# Patient Record
Sex: MALE | Race: WHITE | NOT HISPANIC OR LATINO | URBAN - METROPOLITAN AREA
[De-identification: names, ages, dates, MRNs, and addresses within clinical notes are randomized per-mention and may not be internally consistent; named-entity substitution may affect disease eponyms.]

---

## 2017-11-15 ENCOUNTER — EMERGENCY (EMERGENCY)
Facility: HOSPITAL | Age: 47
LOS: 1 days | Discharge: HOME | End: 2017-11-15
Attending: EMERGENCY MEDICINE
Payer: COMMERCIAL

## 2017-11-15 VITALS
DIASTOLIC BLOOD PRESSURE: 88 MMHG | HEART RATE: 97 BPM | OXYGEN SATURATION: 97 % | HEIGHT: 69 IN | RESPIRATION RATE: 16 BRPM | WEIGHT: 175.05 LBS | SYSTOLIC BLOOD PRESSURE: 132 MMHG | TEMPERATURE: 98 F

## 2017-11-15 DIAGNOSIS — J45.20 MILD INTERMITTENT ASTHMA, UNCOMPLICATED: ICD-10-CM

## 2017-11-15 DIAGNOSIS — R55 SYNCOPE AND COLLAPSE: ICD-10-CM

## 2017-11-15 DIAGNOSIS — Z85.841 PERSONAL HISTORY OF MALIGNANT NEOPLASM OF BRAIN: ICD-10-CM

## 2017-11-15 DIAGNOSIS — Z98.890 OTHER SPECIFIED POSTPROCEDURAL STATES: ICD-10-CM

## 2017-11-15 DIAGNOSIS — R11.10 VOMITING, UNSPECIFIED: ICD-10-CM

## 2017-11-15 DIAGNOSIS — Z79.82 LONG TERM (CURRENT) USE OF ASPIRIN: ICD-10-CM

## 2017-11-15 DIAGNOSIS — R47.01 APHASIA: ICD-10-CM

## 2017-11-15 DIAGNOSIS — Z79.51 LONG TERM (CURRENT) USE OF INHALED STEROIDS: ICD-10-CM

## 2017-11-15 DIAGNOSIS — Z79.899 OTHER LONG TERM (CURRENT) DRUG THERAPY: ICD-10-CM

## 2017-11-15 DIAGNOSIS — I10 ESSENTIAL (PRIMARY) HYPERTENSION: ICD-10-CM

## 2017-11-15 LAB
ANION GAP SERPL CALC-SCNC: 13 MMOL/L — SIGNIFICANT CHANGE UP (ref 5–17)
APTT BLD: 29.5 SEC — SIGNIFICANT CHANGE UP (ref 27.5–37.4)
BASOPHILS # BLD AUTO: 0 K/UL — SIGNIFICANT CHANGE UP (ref 0–0.2)
BASOPHILS NFR BLD AUTO: 0.4 % — SIGNIFICANT CHANGE UP (ref 0–2)
BUN SERPL-MCNC: 8 MG/DL — SIGNIFICANT CHANGE UP (ref 8–20)
CALCIUM SERPL-MCNC: 9.3 MG/DL — SIGNIFICANT CHANGE UP (ref 8.6–10.2)
CHLORIDE SERPL-SCNC: 99 MMOL/L — SIGNIFICANT CHANGE UP (ref 98–107)
CK SERPL-CCNC: 69 U/L — SIGNIFICANT CHANGE UP (ref 30–200)
CO2 SERPL-SCNC: 26 MMOL/L — SIGNIFICANT CHANGE UP (ref 22–29)
CREAT SERPL-MCNC: 0.69 MG/DL — SIGNIFICANT CHANGE UP (ref 0.5–1.3)
EOSINOPHIL # BLD AUTO: 0 K/UL — SIGNIFICANT CHANGE UP (ref 0–0.5)
EOSINOPHIL NFR BLD AUTO: 0 % — SIGNIFICANT CHANGE UP (ref 0–5)
GLUCOSE SERPL-MCNC: 130 MG/DL — HIGH (ref 70–115)
HCT VFR BLD CALC: 37.7 % — LOW (ref 42–52)
HGB BLD-MCNC: 12.9 G/DL — LOW (ref 14–18)
INR BLD: 1.04 RATIO — SIGNIFICANT CHANGE UP (ref 0.88–1.16)
LYMPHOCYTES # BLD AUTO: 0.8 K/UL — LOW (ref 1–4.8)
LYMPHOCYTES # BLD AUTO: 17.7 % — LOW (ref 20–55)
MAGNESIUM SERPL-MCNC: 1.8 MG/DL — SIGNIFICANT CHANGE UP (ref 1.6–2.6)
MCHC RBC-ENTMCNC: 29.1 PG — SIGNIFICANT CHANGE UP (ref 27–31)
MCHC RBC-ENTMCNC: 34.2 G/DL — SIGNIFICANT CHANGE UP (ref 32–36)
MCV RBC AUTO: 85.1 FL — SIGNIFICANT CHANGE UP (ref 80–94)
MONOCYTES # BLD AUTO: 0.4 K/UL — SIGNIFICANT CHANGE UP (ref 0–0.8)
MONOCYTES NFR BLD AUTO: 9.4 % — SIGNIFICANT CHANGE UP (ref 3–10)
NEUTROPHILS # BLD AUTO: 3.5 K/UL — SIGNIFICANT CHANGE UP (ref 1.8–8)
NEUTROPHILS NFR BLD AUTO: 72.5 % — SIGNIFICANT CHANGE UP (ref 37–73)
PHOSPHATE SERPL-MCNC: 2.5 MG/DL — SIGNIFICANT CHANGE UP (ref 2.4–4.7)
PLATELET # BLD AUTO: 196 K/UL — SIGNIFICANT CHANGE UP (ref 150–400)
POTASSIUM SERPL-MCNC: 3.6 MMOL/L — SIGNIFICANT CHANGE UP (ref 3.5–5.3)
POTASSIUM SERPL-SCNC: 3.6 MMOL/L — SIGNIFICANT CHANGE UP (ref 3.5–5.3)
PROTHROM AB SERPL-ACNC: 11.5 SEC — SIGNIFICANT CHANGE UP (ref 9.8–12.7)
RBC # BLD: 4.43 M/UL — LOW (ref 4.6–6.2)
RBC # FLD: 13.5 % — SIGNIFICANT CHANGE UP (ref 11–15.6)
SODIUM SERPL-SCNC: 138 MMOL/L — SIGNIFICANT CHANGE UP (ref 135–145)
VALPROATE SERPL-MCNC: <3.7 UG/ML — LOW (ref 50–100)
WBC # BLD: 4.8 K/UL — SIGNIFICANT CHANGE UP (ref 4.8–10.8)
WBC # FLD AUTO: 4.8 K/UL — SIGNIFICANT CHANGE UP (ref 4.8–10.8)

## 2017-11-15 PROCEDURE — 84100 ASSAY OF PHOSPHORUS: CPT

## 2017-11-15 PROCEDURE — 85027 COMPLETE CBC AUTOMATED: CPT

## 2017-11-15 PROCEDURE — 80048 BASIC METABOLIC PNL TOTAL CA: CPT

## 2017-11-15 PROCEDURE — 83735 ASSAY OF MAGNESIUM: CPT

## 2017-11-15 PROCEDURE — 93005 ELECTROCARDIOGRAM TRACING: CPT

## 2017-11-15 PROCEDURE — 36415 COLL VENOUS BLD VENIPUNCTURE: CPT

## 2017-11-15 PROCEDURE — 99283 EMERGENCY DEPT VISIT LOW MDM: CPT | Mod: 25

## 2017-11-15 PROCEDURE — 82550 ASSAY OF CK (CPK): CPT

## 2017-11-15 PROCEDURE — 99285 EMERGENCY DEPT VISIT HI MDM: CPT

## 2017-11-15 PROCEDURE — 93010 ELECTROCARDIOGRAM REPORT: CPT

## 2017-11-15 PROCEDURE — 80164 ASSAY DIPROPYLACETIC ACD TOT: CPT

## 2017-11-15 PROCEDURE — 85730 THROMBOPLASTIN TIME PARTIAL: CPT

## 2017-11-15 PROCEDURE — 85610 PROTHROMBIN TIME: CPT

## 2017-11-15 NOTE — ED PROVIDER NOTE - PHYSICAL EXAMINATION
neuro: CN II - XII intact, EOMI, PERRL, no papilledema, 5/5 muscle strength x 4 extremities, no sensory deficits, 2+ dtr globally, negative babinski, no ataxic gait, normal NEHEMIAS and FNT, normal romberg

## 2017-11-15 NOTE — ED PROVIDER NOTE - MEDICAL DECISION MAKING DETAILS
pt knowen to me back to baseline near syncoope will cover abx if develops symtpoms back to baserline likely focal seizure chornic pt back at baseline neuro exam showing neuro: CN II - XII intact, EOMI, PERRL, no papilledema, 5/5 muscle strength x 4 extremities, no sensory deficits, 2+ dtr globally, negative babinski, no ataxic gait, normal NEHEMIAS and FNT, normal romberg

## 2017-11-15 NOTE — ED ADULT TRIAGE NOTE - CHIEF COMPLAINT QUOTE
Rapid response on floor, may have aspirated vomit. Reports hx of episodes of aphasia after brain surgery. States last episode was 2 months ago. Current episode during triage, . Denies trauma, injury or falls. 20g placed during rapid.

## 2017-11-15 NOTE — ED PROVIDER NOTE - OBJECTIVE STATEMENT
pt with near syncope after smelling vomit while working and slight aphasia normal for him after he has slight focal seizure for which he is compliant with lamictal [facility] denies any  complaints to me . denies fever. denies HA or neck pain. no chest pain or sob. no abd pain. no n/v/d. no urinary f/u/d. no back pain. no motor or sensory deficits. denies illicit drug use. no recent travel. no rash. no other acute issues symptoms or concerns

## 2020-04-25 ENCOUNTER — MESSAGE (OUTPATIENT)
Age: 50
End: 2020-04-25

## 2020-05-01 ENCOUNTER — APPOINTMENT (OUTPATIENT)
Dept: DISASTER EMERGENCY | Facility: HOSPITAL | Age: 50
End: 2020-05-01

## 2020-05-02 LAB
SARS-COV-2 IGG SERPL IA-ACNC: <0.1 INDEX
SARS-COV-2 IGG SERPL QL IA: NEGATIVE

## 2022-01-06 ENCOUNTER — APPOINTMENT (OUTPATIENT)
Dept: UROLOGY | Facility: CLINIC | Age: 52
End: 2022-01-06
Payer: COMMERCIAL

## 2022-01-06 VITALS
BODY MASS INDEX: 23.1 KG/M2 | SYSTOLIC BLOOD PRESSURE: 143 MMHG | WEIGHT: 165 LBS | HEIGHT: 71 IN | HEART RATE: 88 BPM | DIASTOLIC BLOOD PRESSURE: 85 MMHG

## 2022-01-06 DIAGNOSIS — N20.0 CALCULUS OF KIDNEY: ICD-10-CM

## 2022-01-06 PROCEDURE — 99204 OFFICE O/P NEW MOD 45 MIN: CPT

## 2022-01-18 NOTE — PHYSICAL EXAM
[General Appearance - Well Developed] : well developed [General Appearance - Well Nourished] : well nourished [Normal Appearance] : normal appearance [Well Groomed] : well groomed [General Appearance - In No Acute Distress] : no acute distress [Edema] : no peripheral edema [Respiration, Rhythm And Depth] : normal respiratory rhythm and effort [Exaggerated Use Of Accessory Muscles For Inspiration] : no accessory muscle use [Abdomen Soft] : soft [Abdomen Tenderness] : non-tender [Costovertebral Angle Tenderness] : no ~M costovertebral angle tenderness [Normal Station and Gait] : the gait and station were normal for the patient's age [] : no rash [No Focal Deficits] : no focal deficits [Oriented To Time, Place, And Person] : oriented to person, place, and time [Affect] : the affect was normal [Mood] : the mood was normal [Not Anxious] : not anxious [FreeTextEntry1] : deferred at pt request

## 2022-01-18 NOTE — ASSESSMENT
[FreeTextEntry1] : Diet modification reviewed at length- increasing fluids (primarily water), citrus is good, and decreasing/moderating salt, animal flesh protein, oxalate containing foods, and moderation of calcium intake (1000 mg/day is USRDA).\par \par I reviewed with the patient the risks of metabolic stone disease given their underlying risk parameters (all of which include large stones, multiple stones, bilateral stones, family history, and young age), and the indications for 24 hour urine metabolic assessment.\par \par We also discussed benefits of regular exercise and weight loss as independent risk reducers for stones.\par \par 1. Diet modification as discussed\par 2. 24 hour urine collection x 2 in the next few weeks\par 3. RTC/ tele to review\par \par May benefit from starting pot citrate therapy given CT findings possible mild medullary nephrocalcinosis and potential for medullary sponge, though faintly seen. Will eval after 24 hour urine\par \par (Had full exam with other docs recently seen)\par

## 2022-01-18 NOTE — HISTORY OF PRESENT ILLNESS
[None] : no symptoms [FreeTextEntry1] : COBY BUI is a 51 year M who presents for eval for stone. CT scan ([facility]) with stone- had experienced left flank pain. Feeling well at this time. No hematuria, no fever.\par \par No sig FH. \par \par CT scan 12/2022 [facility]: suggestion of stone/nephrocalcinosis (mild), but may be reflective of medullary sponge kidney\par \par PMH: htn\par PSH: neuro sx\par FH: no sig  FH\par Meds: asa, valsartan\par SH: social etoh, nonsmoker\par Allergies: nkda\par

## 2022-01-20 ENCOUNTER — EMERGENCY (EMERGENCY)
Facility: HOSPITAL | Age: 52
LOS: 1 days | Discharge: HOME | End: 2022-01-20
Attending: EMERGENCY MEDICINE
Payer: COMMERCIAL

## 2022-01-20 VITALS
HEART RATE: 84 BPM | TEMPERATURE: 98 F | OXYGEN SATURATION: 97 % | SYSTOLIC BLOOD PRESSURE: 168 MMHG | RESPIRATION RATE: 21 BRPM | HEIGHT: 69 IN | DIASTOLIC BLOOD PRESSURE: 74 MMHG

## 2022-01-20 VITALS — DIASTOLIC BLOOD PRESSURE: 74 MMHG | HEART RATE: 87 BPM | SYSTOLIC BLOOD PRESSURE: 131 MMHG

## 2022-01-20 LAB
ALBUMIN SERPL ELPH-MCNC: 5 G/DL — SIGNIFICANT CHANGE UP (ref 3.3–5.2)
ALP SERPL-CCNC: 41 U/L — SIGNIFICANT CHANGE UP (ref 40–120)
ALT FLD-CCNC: 21 U/L — SIGNIFICANT CHANGE UP
ANION GAP SERPL CALC-SCNC: 15 MMOL/L — SIGNIFICANT CHANGE UP (ref 5–17)
APTT BLD: 27.7 SEC — SIGNIFICANT CHANGE UP (ref 27.5–35.5)
AST SERPL-CCNC: 22 U/L — SIGNIFICANT CHANGE UP
BASE EXCESS BLDV CALC-SCNC: -1.1 MMOL/L — SIGNIFICANT CHANGE UP (ref -2–3)
BASOPHILS # BLD AUTO: 0.06 K/UL — SIGNIFICANT CHANGE UP (ref 0–0.2)
BASOPHILS NFR BLD AUTO: 0.9 % — SIGNIFICANT CHANGE UP (ref 0–2)
BILIRUB SERPL-MCNC: 0.7 MG/DL — SIGNIFICANT CHANGE UP (ref 0.4–2)
BUN SERPL-MCNC: 19.9 MG/DL — SIGNIFICANT CHANGE UP (ref 8–20)
CALCIUM SERPL-MCNC: 9.6 MG/DL — SIGNIFICANT CHANGE UP (ref 8.6–10.2)
CHLORIDE SERPL-SCNC: 101 MMOL/L — SIGNIFICANT CHANGE UP (ref 98–107)
CO2 SERPL-SCNC: 21 MMOL/L — LOW (ref 22–29)
CREAT SERPL-MCNC: 0.63 MG/DL — SIGNIFICANT CHANGE UP (ref 0.5–1.3)
EOSINOPHIL # BLD AUTO: 0 K/UL — SIGNIFICANT CHANGE UP (ref 0–0.5)
EOSINOPHIL NFR BLD AUTO: 0 % — SIGNIFICANT CHANGE UP (ref 0–6)
FLUAV AG NPH QL: SIGNIFICANT CHANGE UP
FLUBV AG NPH QL: SIGNIFICANT CHANGE UP
GLUCOSE SERPL-MCNC: 114 MG/DL — HIGH (ref 70–99)
HCO3 BLDV-SCNC: 23 MMOL/L — SIGNIFICANT CHANGE UP (ref 22–29)
HCT VFR BLD CALC: 39.6 % — SIGNIFICANT CHANGE UP (ref 39–50)
HGB BLD-MCNC: 12.9 G/DL — LOW (ref 13–17)
IMM GRANULOCYTES NFR BLD AUTO: 0.2 % — SIGNIFICANT CHANGE UP (ref 0–1.5)
INR BLD: 1.03 RATIO — SIGNIFICANT CHANGE UP (ref 0.88–1.16)
LYMPHOCYTES # BLD AUTO: 0.92 K/UL — LOW (ref 1–3.3)
LYMPHOCYTES # BLD AUTO: 14.4 % — SIGNIFICANT CHANGE UP (ref 13–44)
MCHC RBC-ENTMCNC: 27.4 PG — SIGNIFICANT CHANGE UP (ref 27–34)
MCHC RBC-ENTMCNC: 32.6 GM/DL — SIGNIFICANT CHANGE UP (ref 32–36)
MCV RBC AUTO: 84.1 FL — SIGNIFICANT CHANGE UP (ref 80–100)
MONOCYTES # BLD AUTO: 0.7 K/UL — SIGNIFICANT CHANGE UP (ref 0–0.9)
MONOCYTES NFR BLD AUTO: 11 % — SIGNIFICANT CHANGE UP (ref 2–14)
NEUTROPHILS # BLD AUTO: 4.68 K/UL — SIGNIFICANT CHANGE UP (ref 1.8–7.4)
NEUTROPHILS NFR BLD AUTO: 73.5 % — SIGNIFICANT CHANGE UP (ref 43–77)
PCO2 BLDV: 36 MMHG — LOW (ref 42–55)
PH BLDV: 7.42 — SIGNIFICANT CHANGE UP (ref 7.32–7.43)
PLATELET # BLD AUTO: 307 K/UL — SIGNIFICANT CHANGE UP (ref 150–400)
PO2 BLDV: 138 MMHG — HIGH (ref 25–45)
POTASSIUM SERPL-MCNC: 4.5 MMOL/L — SIGNIFICANT CHANGE UP (ref 3.5–5.3)
POTASSIUM SERPL-SCNC: 4.5 MMOL/L — SIGNIFICANT CHANGE UP (ref 3.5–5.3)
PROT SERPL-MCNC: 7.5 G/DL — SIGNIFICANT CHANGE UP (ref 6.6–8.7)
PROTHROM AB SERPL-ACNC: 11.9 SEC — SIGNIFICANT CHANGE UP (ref 10.6–13.6)
RBC # BLD: 4.71 M/UL — SIGNIFICANT CHANGE UP (ref 4.2–5.8)
RBC # FLD: 12.6 % — SIGNIFICANT CHANGE UP (ref 10.3–14.5)
RSV RNA NPH QL NAA+NON-PROBE: SIGNIFICANT CHANGE UP
SAO2 % BLDV: 99.7 % — SIGNIFICANT CHANGE UP
SARS-COV-2 RNA SPEC QL NAA+PROBE: SIGNIFICANT CHANGE UP
SODIUM SERPL-SCNC: 137 MMOL/L — SIGNIFICANT CHANGE UP (ref 135–145)
TROPONIN T SERPL-MCNC: <0.01 NG/ML — SIGNIFICANT CHANGE UP (ref 0–0.06)
WBC # BLD: 6.37 K/UL — SIGNIFICANT CHANGE UP (ref 3.8–10.5)
WBC # FLD AUTO: 6.37 K/UL — SIGNIFICANT CHANGE UP (ref 3.8–10.5)

## 2022-01-20 PROCEDURE — 99284 EMERGENCY DEPT VISIT MOD MDM: CPT

## 2022-01-20 PROCEDURE — 70496 CT ANGIOGRAPHY HEAD: CPT | Mod: MA

## 2022-01-20 PROCEDURE — 99284 EMERGENCY DEPT VISIT MOD MDM: CPT | Mod: 25

## 2022-01-20 PROCEDURE — 99291 CRITICAL CARE FIRST HOUR: CPT

## 2022-01-20 PROCEDURE — 96374 THER/PROPH/DIAG INJ IV PUSH: CPT | Mod: XU

## 2022-01-20 PROCEDURE — 70498 CT ANGIOGRAPHY NECK: CPT | Mod: MA

## 2022-01-20 PROCEDURE — 82803 BLOOD GASES ANY COMBINATION: CPT

## 2022-01-20 PROCEDURE — 80053 COMPREHEN METABOLIC PANEL: CPT

## 2022-01-20 PROCEDURE — 95819 EEG AWAKE AND ASLEEP: CPT | Mod: 26

## 2022-01-20 PROCEDURE — 85730 THROMBOPLASTIN TIME PARTIAL: CPT

## 2022-01-20 PROCEDURE — 99223 1ST HOSP IP/OBS HIGH 75: CPT

## 2022-01-20 PROCEDURE — 36415 COLL VENOUS BLD VENIPUNCTURE: CPT

## 2022-01-20 PROCEDURE — 87637 SARSCOV2&INF A&B&RSV AMP PRB: CPT

## 2022-01-20 PROCEDURE — 82962 GLUCOSE BLOOD TEST: CPT

## 2022-01-20 PROCEDURE — 0042T: CPT

## 2022-01-20 PROCEDURE — 85610 PROTHROMBIN TIME: CPT

## 2022-01-20 PROCEDURE — 95819 EEG AWAKE AND ASLEEP: CPT

## 2022-01-20 PROCEDURE — 70498 CT ANGIOGRAPHY NECK: CPT | Mod: 26,MA

## 2022-01-20 PROCEDURE — 93005 ELECTROCARDIOGRAM TRACING: CPT

## 2022-01-20 PROCEDURE — 85025 COMPLETE CBC W/AUTO DIFF WBC: CPT

## 2022-01-20 PROCEDURE — 84484 ASSAY OF TROPONIN QUANT: CPT

## 2022-01-20 PROCEDURE — 93010 ELECTROCARDIOGRAM REPORT: CPT

## 2022-01-20 PROCEDURE — 70450 CT HEAD/BRAIN W/O DYE: CPT | Mod: MA

## 2022-01-20 PROCEDURE — 70496 CT ANGIOGRAPHY HEAD: CPT | Mod: 26,MA

## 2022-01-20 RX ORDER — LEVETIRACETAM 250 MG/1
1000 TABLET, FILM COATED ORAL ONCE
Refills: 0 | Status: DISCONTINUED | OUTPATIENT
Start: 2022-01-20 | End: 2022-01-20

## 2022-01-20 RX ORDER — LEVETIRACETAM 250 MG/1
1 TABLET, FILM COATED ORAL
Qty: 28 | Refills: 0
Start: 2022-01-20 | End: 2022-02-02

## 2022-01-20 RX ORDER — LEVETIRACETAM 250 MG/1
1000 TABLET, FILM COATED ORAL ONCE
Refills: 0 | Status: DISCONTINUED | OUTPATIENT
Start: 2022-01-20 | End: 2022-01-24

## 2022-01-20 RX ORDER — LEVETIRACETAM 250 MG/1
2000 TABLET, FILM COATED ORAL EVERY 12 HOURS
Refills: 0 | Status: DISCONTINUED | OUTPATIENT
Start: 2022-01-20 | End: 2022-01-20

## 2022-01-20 RX ADMIN — Medication 2 MILLIGRAM(S): at 09:30

## 2022-01-20 RX ADMIN — LEVETIRACETAM 400 MILLIGRAM(S): 250 TABLET, FILM COATED ORAL at 09:30

## 2022-01-20 NOTE — ED PROVIDER NOTE - CARE PROVIDER_API CALL
Mando Harper)  EEGEpilepsy; Neurology  [address] [place] [address]  Phone: [phone]  Fax: [phone]  Follow Up Time: Urgent

## 2022-01-20 NOTE — EEG REPORT - NS EEG TEXT BOX
[facility]   COMPREHENSIVE EPILEPSY CENTER   REPORT OF ROUTINE VIDEO EEG     [facility]: [address], [place] [address], Ph#: [phone]  [facility]: [address], [place] [address], Ph#: [phone]  [facility]: [address] [place] [address], Ph#: [phone]    Patient Name: COBY BUI  Age and : 51y ([birth year])  MRN #: [id number]  Location: [facility] ED  Referring Physician: Not Available Doctor    Study Date: 22    _____________________________________________________________  TECHNICAL INFORMATION    Placement and Labeling of Electrodes:  The EEG was performed utilizing 20 channels referential EEG connections (coronal over temporal over parasagittal montage) using all standard 10-20 electrode placements with EKG.  Recording was at a sampling rate of 256 samples per second per channel.  Time synchronized digital video recording was done simultaneously with EEG recording.  A low light infrared camera was used for low light recording.  Rohit and seizure detection algorithms were utilized.    _____________________________________________________________  HISTORY    Patient is a 51y old  Male who presents with a chief complaint of seizure.    PERTINENT MEDICATION:  levETIRAcetam  IVPB 1000 milliGRAM(s) IV Intermittent once    _____________________________________________________________  STUDY INTERPRETATION    Findings: The background was continuous and reactive. During wakefulness, the posterior dominant rhythm consisted of symmetric, well-modulated 10 Hz activity, with amplitude to 30 uV, that attenuated to eye opening.      Background Slowing:  No generalized background slowing was present.    Focal Slowing:   None were present.    Sleep Background:  Drowsiness was characterized by fragmentation, attenuation, and slowing of the background activity.    Sleep was characterized by the presence of vertex waves, symmetric sleep spindles and K-complexes.    Other Non-Epileptiform Findings:  None were present.    Interictal Epileptiform Activity:   None were present.    Events:  Clinical events: None recorded.  Seizures: None recorded.    Activation Procedures:   Hyperventilation was not performed.    Photic stimulation was performed and did not elicit any abnormality.     Artifacts:  Intermittent myogenic and movement artifacts were noted.    ECG:  The heart rate on single channel ECG was predominantly between 70-80 BPM.    _____________________________________________________________  EEG SUMMARY/CLASSIFICATION    Normal EEG in the awake, drowsy and asleep states.    _____________________________________________________________  EEG IMPRESSION/CLINICAL CORRELATE    Normal EEG study.  No epileptiform pattern or seizure seen.    _____________________________________________________________    Mando Harper MD  Director, Epilepsy/EMU - [facility]

## 2022-01-20 NOTE — ED PROVIDER NOTE - PROGRESS NOTE DETAILS
witness generalized tonic clonic seizure lasting 20 seconds. Ativan given and started on keppra. -DO Graciela Epilepsy paged. Bobbi Dimas DO Spoke with Dr. Harper, will see patient. Bobbi Dimas DO Dr. Harper saw patient. Patient planned for 20 min EEG. Prefer that patient receives MRI as outpatient as quality is better. Spoke with patient and wife who agree with plan. Pending EEG will dc home with eze 1g BID. Bobbi Dimas DO

## 2022-01-20 NOTE — ED PROVIDER NOTE - CLINICAL SUMMARY MEDICAL DECISION MAKING FREE TEXT BOX
52yo male with history of HTN, asthma, MVP, seizure disorder, s/p temporal lobe mass resection presenting with aphasia and weakness s/p witness seizure. Patient taken off of antiepileptic medication and last seizure prior to temporal lobe mass resection. NIH score of 1 for aphasia. Post ictal on examination, without tongue laceration or abrasion, no urinary incontinence. Patient returned to baseline. Labs, imaging.

## 2022-01-20 NOTE — CONSULT NOTE ADULT - ASSESSMENT
The patient is a 51y Male who is followed by neurology because of code stroke    Code stroke  initially had aphasia, had generalized seizure in ED  Has history of left temporal lobe resection for seizure in 2016    This is not a stroke, but the aphasia could have been seizure or aura  Agree with keppra for now    I have asked Dr Harper from our Epilepsy service to assess him and treat for his seizures    Discussed with Dee Dimas and Leroy.    Thank you for allowing me to participate in the care of your patient    Ruben Veronica MD, PhD   [id number]
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 Hernandez, NSG Dr. Mcfarland at [facility]/[place]), off antiseizure medication since 2017/2018 who presented with seizure recurrence. Personally reviewed all imagines, labs, EEG and other studies.    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 Will resume antiseizure medication.     Recommendation:  - another levetiracetam 1000mg, so patient will be loaded a total of 2000mg   - discharge on levetiracetam 1000mg BID  - 3T MRI brain w/wo epilepsy protocol outpatient   - plan for 48h aEEG outpatient   - patient was educated regarding risks and driving privileges associated with the [facility] Guidelines; patient instructed not to drive at this time      Thank you for allowing Epilepsy to participate in the care of this patient.   ______________________  Mando Harper MD   Director, Epilepsy/EMU - [facility]   Epilepsy Consult #: [phone] ([phone])

## 2022-01-20 NOTE — CONSULT NOTE ADULT - SUBJECTIVE AND OBJECTIVE BOX
[facility]                                     Neurology at [place]                                 Glen[address] Schaeffer, [address]                                  [address]                                        [place], [place], [address]                                             [phone]    CC: code stroke, aphasia  HPI: The patient is a 51y Male who presented with acute onset of dificulty speaking at about 0845 today.  He felt funny and had aphasia.  He was brought to the ED and code stroke called.  While in the ED he had a generalized tonic clonic seizure.  he has had seizures in past due to cortivcal dysplasia in left temporal lobe and is s/p resection.  I responded to the code stroke.    PAST MEDICAL & SURGICAL HISTORY:  Hypertension    Mild intermittent asthma without complication    Mitral valve prolapse    epilepsy surgery left temporal lobe resection in 2016        MEDICATIONS  (STANDING):  levETIRAcetam 1000 milliGRAM(s) Oral once    MEDICATIONS  (PRN):      Allergies    No Known Allergies    Intolerances        SOCIAL HISTORY:  no tob,   no alcohol   no drugs    FAMILY HISTORY:  Family history of TIAs (Father, Mother)          ROS: 14 point ROS negative other than what is present in HPI or below    Vital Signs Last 24 Hrs  T(C): 36.8 (20 Jan 2022 09:05), Max: 36.8 (20 Jan 2022 09:05)  T(F): 98.3 (20 Jan 2022 09:05), Max: 98.3 (20 Jan 2022 09:05)  HR: 85 (20 Jan 2022 09:15) (84 - 85)  BP: 141/92 (20 Jan 2022 09:15) (141/92 - 168/74)  BP(mean): --  RR: 19 (20 Jan 2022 09:15) (19 - 21)  SpO2: 98% (20 Jan 2022 09:15) (97% - 98%)      General: NAD    Detailed Neurologic Exam:    Mental status: The patient is awake and alert and has normal attention span.  The patient is fully oriented in 3 spheres. The patient is oriented to current events. The patient is able to name objects, follow commands, repeat sentences.    Cranial nerves: Pupils equal and react symmetrically to light. There is no visual field deficit to confrontation. Extraocular motion is full with no nystagmus. There is no ptosis. Facial sensation is intact. Facial musculature is symmetric. Palate elevates symmetrically. Shoulder shrug is normal. Tongue is midline.    Motor: There is normal bulk and tone.  There is no tremor.  Strength is 5/5 in the right arm and leg.   Strength is 5/5 in the left arm and leg.    Sensation: Intact to light touch and pin in 4 extremities    Reflexes: 1-2+ throughout and plantar responses are flexor.    Cerebellar: There is no dysmetria on finger to nose testing.    Gait : deferred    [facility] SS:  DATE: 1/20/2022  TIME: 1100  1A: Level of consciousness (0-3): 0  1B: Questions (0-2): 0  1C: Commands (0-2): 0  2: Gaze (0-2): 0  3: Visual fields (0-3): 0  4: Facial palsy (0-3): 0  MOTOR:  5A: Left arm motor drift (0-4): 0  5B: Right arm motor drift (0-4): 0  6A: Left leg motor drift (0-4): 0  6B: Right leg motor drift (0-4): 0  7: Limb ataxia (0-2): 0  SENSORY:  8: Sensation (0-2): 0  SPEECH:  9: Language (0-3): 0  10: Dysarthria (0-2): 0  EXTINCTION:  11: Extinction/inattention (0-2): 0    TOTAL SCORE: 0          LABS:                         12.9   6.37  )-----------( 307      ( 20 Jan 2022 09:01 )             39.6       01-20    137  |  101  |  19.9  ----------------------------<  114<H>  4.5   |  21.0<L>  |  0.63    Ca    9.6      20 Jan 2022 09:01    TPro  7.5  /  Alb  5.0  /  TBili  0.7  /  DBili  x   /  AST  22  /  ALT  21  /  AlkPhos  41  01-20      PT/INR - ( 20 Jan 2022 09:01 )   PT: 11.9 sec;   INR: 1.03 ratio         PTT - ( 20 Jan 2022 09:01 )  PTT:27.7 sec    RADIOLOGY & ADDITIONAL STUDIES (independently reviewed unless otherwise noted):  CT head: no acute CVA, mass or blood (+) post surgical changes left temporal lobe  CTA head: no aneurysm, AVM, LVO or sig stenosis in COW  CTA neck: no sig carotid or vertebral stenosis  CT Perfusion head - CBF<30% volume 0ml, Tmax>6s volume =0ml

## 2022-01-20 NOTE — ED PROVIDER NOTE - NSFOLLOWUPINSTRUCTIONS_ED_ALL_ED_FT
1. Follow up with your primary care physician within 2-3days for reevaluation.  2.  Return to the Emergency Department for worsening, progressive or any other concerning symptoms.   3.  Do not drive until you are cleared by your neurologist.   4. Take medications as prescribed.     A seizure is abnormal electrical activity in the brain; the specific cause may or may not be found. Prior to a seizure you may experience a warning sensation (aura) that may include fear, nausea, dizziness, and visual changes such as flashing lights of spots. Common symptoms during the seizure may include an altered mental status, rhythmic jerking movements, drooling, grunting, loss of bladder or bowel control, or tongue biting. After a seizure, you may feel confused and sleepy.     Do not swim, drive, operate machinery, or engage in any risky activity during which a seizure could cause further injury to you or others. Teach friends and family what to do if you HAVE a seizure which includes laying you on the ground with your head on a cushion and turning you to the side to keep your breathing passages clear in case of vomiting.    SEEK IMMEDIATE MEDICAL CARE IF YOU HAVE ANY OF THE FOLLOWING SYMPTOMS: seizure lasting over 5 minutes, not waking up or persistent altered mental status after the seizure, or more frequent or worsening seizures. 1. Follow up with your neurologist within 2-3days for reevaluation.  2.  Return to the Emergency Department for worsening, progressive or any other concerning symptoms.   3.  Do not drive until you are cleared by your neurologist.   4. Take medications as prescribed.     A seizure is abnormal electrical activity in the brain; the specific cause may or may not be found. Prior to a seizure you may experience a warning sensation (aura) that may include fear, nausea, dizziness, and visual changes such as flashing lights of spots. Common symptoms during the seizure may include an altered mental status, rhythmic jerking movements, drooling, grunting, loss of bladder or bowel control, or tongue biting. After a seizure, you may feel confused and sleepy.     Do not swim, drive, operate machinery, or engage in any risky activity during which a seizure could cause further injury to you or others. Teach friends and family what to do if you HAVE a seizure which includes laying you on the ground with your head on a cushion and turning you to the side to keep your breathing passages clear in case of vomiting.    SEEK IMMEDIATE MEDICAL CARE IF YOU HAVE ANY OF THE FOLLOWING SYMPTOMS: seizure lasting over 5 minutes, not waking up or persistent altered mental status after the seizure, or more frequent or worsening seizures.

## 2022-01-20 NOTE — CONSULT NOTE ADULT - SUBJECTIVE AND OBJECTIVE BOX
[facility]                                                                     MD Hillary Wang DO                                              Epilepsy Consult #: [phone] ([phone])                                               Office: [address] [place] [address], [facility]                                                 Phone: [phone]; Fax: [phone]                            ==============================================    EPILEPSY INITIAL CONSULT NOTE      ADMITTING DIAGNOSIS:     HPI:  This is a 51y RH male with a history of HTN, asthma, MVP, 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 Hernandez, NSG Dr. rBuna CARDENAS/[facility]), off antiseizure medication since 2017/2018 who presented with seizures.     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 On 6/10/2016, patient was talking to his colleague at work when he suddenly noted difficulty getting the words out. No problem with comprehension or writing. No change in mental status. Sometime, expressive aphasia accompanied by inability to move his right arm. Symptoms waxed and weaned over next two days, each episode lasting 1-3m. First evaluated in [employer] ER, later transferred to [facility]. Negative stroke workup. MRI brain showed a nonenhancing 1.0 x 0.8 cm lesion in the left anterior temporal cortex. Continuous video EEG at [facility] recorded 2 habitual expressive aphasia without ictal correlate on EEG. Patient was diagnosed with scalp-negative focal seizures and discharged on divalproex sodium . Patient then saw epileptologist Dr. Hernandez at [facility]. Patient did not tolerate divalproex sodium DR due to mental cloudiness, so he was switched to lamotrigine, with levetiracetam acting as bridging agent while on up-titration of lamotrigine. Eventually underwent resection of the left temporal cortical lesion in 12/2016 at [facility] by NSG Dr. Mcfarland, which was determined to be cortical dysplasia. After surgery and while on lamotrigine monotherapy, patient continued to have rare very brief aphasic episodes. One [facility] ER visit on 11/15/2017 documented such an event. Sometime end of 2017 or beginning of 2018, patient requested to come off of lamotrigine and was tapered off. Patient does not recall any more aphasic episodes, until earlier today. Patient was in the elevator of [employer] when he started to have fluctuating expressive aphasia, followed by a focal to bilateral tonic clonic seizure in ER that lasted ~20s. S/p lorazepam 2mg and levetiracetam 1gm. Currently back to baseline. Routine EEG normal. CTH/CTA/CTP only remarkable for left pterional craniotomy and encephalomalacia and gliosis in the left anterior temporal lobe.    SEIZURE DESCRIPTION AND TYPE:  Type #1  Severity: focal aware seizure   Onset: 6/20/2016  Quality & associated signs/symptoms: expressive aphasia +/- inability to move right arm  Duration: few min, may wax and wean for hours  Timing: last seizure end of 2017 on LTG -> no sz off antiseizure medication (LTG discontinued end of 2017 or beginning of 2018) -> recurrence 1/20/22  Modifying factors: triggered by absence of antiseizure medication   Circadian variation: no    Type #2  Severity: focal to bilateral tonic clonic seizure   Onset: 1/20/22  Quality & associated signs/symptoms: type #1 -> full body convulsion  Duration: 20s  Timing: once  Modifying factors: triggered by absence of antiseizure medication   Circadian variation: no    EPILEPSY TYPE: focal epilepsy, 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 No history of febrile seizure, TBI, CNS infection, or FH of seizures.    CURRENT ASM:  none    PREVIOUS ASM:  divalproex sodium DR 500mg BID - briefly in 6/2016, cognitive impairment     IMAGING:   MRI w/ contrast 6/12/16 ([facility]): 1.0 x 0.8 cm anterior left temporal lobe cortical T2/FLAIR hyperintense lesion without abnormal enhancement.The hippocampal structures are symmetric in morphology and signal intensity.    NEUROPHYSIOLOGY:  rEEG 1/20/22 ([facility]): normal A/D/S    cvEEG 6/12 - 6/15/2016 ([facility]):   - i: Two habitual expressive aphasia captured without ictal correlate on EEG.   - ii: Normal interictal recording.    NEUROPSYCHOLOGY:   Has had NPT at [facility].         PMH:  Hypertension    Mild intermittent asthma without complication    Mitral valve prolapse        PSH:  Resection of left temporal cortical dysplasia 12/2016        MEDICATION:  levETIRAcetam  IVPB 1000 milliGRAM(s) IV Intermittent once    ALLERGIES:  No Known Allergies    FH:  noncontributory    SH:  Social EtOH. No tobacco, illicit drugs.    ROS:  Neurology as per HPI, otherwise negative for constitutional, skin, eyes, ENT, respiratory, cardiovascular, gastrointestinal, genitourinary, musculoskeletal, psychiatric, hematology/lymphatics, endocrine, allergic/immunologic.    VITALS:  T(C): 36.8 (01-20-22 @ 09:05), Max: 36.8 (01-20-22 @ 09:05)  HR: 85 (01-20-22 @ 09:15) (84 - 85)  BP: 141/92 (01-20-22 @ 09:15) (141/92 - 168/74)  ABP: --  RR: 19 (01-20-22 @ 09:15) (19 - 21)  SpO2: 98% (01-20-22 @ 09:15) (97% - 98%)  CVP(cm H2O): --    GENERAL PHYSICAL EXAM:  GEN: no distress  HEENT: NCAT, OP clear  EYES: sclera white, conjunctiva clear, no nystagmus  NECK: supple  CV: RRR, no murmur     		  PULM: CTAB, no wheezing  ABD: soft, +BS, NT  EXT: peripheral pulse intact, no cyanosis  MSK: muscle tone and strength normal  SKIN: warm, dry    NEUROLOGICAL EXAM:  Mental Status  Orientation: alert and oriented to person, place, time, and situation   Language: clear and fluent    Cranial Nerves  II: full visual fields intact   III, IV, VI: PERRL, EOMI  V, VII: facial sensation and movement intact and symmetric   VIII: hearing intact   IX, X: uvula midline, soft palate elevates normally   XI: BL shoulder shrug intact   XII: tongue midline    Motor  5/5 BUE and BLE                 Tone and bulk are normal in upper and lower limbs  No pronator drift    Sensation  Intact to light touch and pinprick in all 4 EXTs    Reflex  2+ in BL biceps and patella                                    Plantar responses downward bilaterally    Coordination  Normal FTN bilaterally    Gait  Deferred      LABS:                          12.9   6.37  )-----------( 307      ( 20 Jan 2022 09:01 )             39.6     01-20    137  |  101  |  19.9  ----------------------------<  114<H>  4.5   |  21.0<L>  |  0.63    Ca    9.6      20 Jan 2022 09:01    TPro  7.5  /  Alb  5.0  /  TBili  0.7  /  DBili  x   /  AST  22  /  ALT  21  /  AlkPhos  41  01-20    CAPILLARY BLOOD GLUCOSE      POCT Blood Glucose.: 118 mg/dL (20 Jan 2022 08:55)    LIVER FUNCTIONS - ( 20 Jan 2022 09:01 )  Alb: 5.0 g/dL / Pro: 7.5 g/dL / ALK PHOS: 41 U/L / ALT: 21 U/L / AST: 22 U/L / GGT: x           PT/INR - ( 20 Jan 2022 09:01 )   PT: 11.9 sec;   INR: 1.03 ratio         PTT - ( 20 Jan 2022 09:01 )  PTT:27.7 sec        OTHER IMAGING AND STUDIES:      < from: CT Angio Neck w/ IV Cont (01.20.22 @ 09:16) >  IMPRESSION:    Brain CT without contrast:  No evidence of intracranial hemorrhage or mass effect. If clinical   suspicion for acute infarct persists, brain MRI can be obtained if there   is no contraindication.    CT perfusion:  No perfusion abnormality.    CT angiography neck: No hemodynamically significant stenosis of the   bilateral cervical ICAs using NASCET criteria.  Patent vertebral   arteries.  No evidence of vascular dissection.    CT angiography brain: No large vessel occlusion. No evidence of aneurysm.    CT head findings were discussed with Dr. Dimas at 1/20/2022 9:18 AM by   Dr. Darrick Gomez with read back confirmation.                                                                              [facility]                                                                     MD Hillary Wang DO                                              Epilepsy Consult #: [phone] ([phone])                                               Office: [address] [place] [address], [facility]                                                 Phone: [phone]; Fax: [phone]                            ==============================================    EPILEPSY INITIAL CONSULT NOTE      ADMITTING DIAGNOSIS:     HPI:  This is a 51y RH male with a history of HTN, asthma, MVP, 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 Hernandez, NSG Dr. Bruna CARDENAS/[facility]), off antiseizure medication since 2017/2018 who presented with seizures.     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 No problem with comprehension or writing. No change in mental status. Sometime, expressive aphasia accompanied by inability to move his right arm. Symptoms waxed and weaned over next two days, each episode lasting 1-3m. First evaluated in [facility] ER, later transferred to [facility]. Negative stroke workup. MRI brain showed a nonenhancing 1.0 x 0.8 cm lesion in the left anterior temporal cortex. Continuous video EEG at [facility] recorded 2 habitual expressive aphasia without ictal correlate on EEG. Patient was diagnosed with scalp-negative focal seizures and discharged on divalproex sodium . Patient then saw epileptologist Dr. Hernandez at [facility]. Patient did not tolerate divalproex sodium DR due to mental cloudiness, so he was switched to lamotrigine, with levetiracetam acting as bridging agent while on up-titration of lamotrigine. Eventually underwent resection of the left temporal cortical lesion in 12/2016 at [facility] by NSG Dr. Mcfarland, which was determined to be cortical dysplasia. After surgery and while on lamotrigine monotherapy, patient continued to have rare very brief aphasic episodes. One [facility] ER visit on 11/15/2017 documented such an event. Sometime end of 2017 or beginning of 2018, patient requested to come off of lamotrigine and was tapered off. Patient does not recall any more aphasic episodes, until earlier today. Patient was in the elevator of [facility] [employer] when he started to have fluctuating expressive aphasia, followed by a focal to bilateral tonic clonic seizure in ER that lasted ~20s. S/p lorazepam 2mg and levetiracetam 1gm. Currently back to baseline. Routine EEG normal. CTH/CTA/CTP only remarkable for left pterional craniotomy and encephalomalacia and gliosis in the left anterior temporal lobe.    SEIZURE DESCRIPTION AND TYPE:  Type #1  Severity: focal aware seizure   Onset: 6/20/2016  Quality & associated signs/symptoms: expressive aphasia +/- inability to move right arm  Duration: few min, may wax and wean for hours  Timing: last seizure end of 2017 on LTG -> no sz off antiseizure medication (LTG discontinued end of 2017 or beginning of 2018) -> recurrence 1/20/22  Modifying factors: triggered by absence of antiseizure medication   Circadian variation: no    Type #2  Severity: focal to bilateral tonic clonic seizure   Onset: 1/20/22  Quality & associated signs/symptoms: type #1 -> full body convulsion  Duration: 20s  Timing: once  Modifying factors: triggered by absence of antiseizure medication   Circadian variation: no    EPILEPSY TYPE: focal epilepsy, 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 No history of febrile seizure, TBI, CNS infection, or FH of seizures.    CURRENT ASM:  none    PREVIOUS ASM:  divalproex sodium DR 500mg BID - briefly in 6/2016, cognitive impairment   levetiracetam - bridging agent when transitioned from divalproex sodium DR to lamotrigine   lamotrigine - discontinued end of 2017 or beginning of 2018    IMAGING:   MRI w/ contrast 6/12/16 ([facility]): 1.0 x 0.8 cm anterior left temporal lobe cortical T2/FLAIR hyperintense lesion without abnormal enhancement. The hippocampal structures are symmetric in morphology and signal intensity.    NEUROPHYSIOLOGY:  rEEG 1/20/22 ([facility]): normal A/D/S    cvEEG 6/12 - 6/15/2016 ([facility]):   - i: Two habitual expressive aphasia captured without ictal correlate on EEG.   - ii: Normal interictal recording.    NEUROPSYCHOLOGY:   Has had NPT at [facility].         PMH:  Hypertension    Mild intermittent asthma without complication    Mitral valve prolapse        PSH:  Resection of left temporal cortical dysplasia 12/2016        MEDICATION:  levETIRAcetam  IVPB 1000 milliGRAM(s) IV Intermittent once    ALLERGIES:  No Known Allergies    FH:  noncontributory    SH:  Social EtOH. No tobacco, illicit drugs.    ROS:  Neurology as per HPI, otherwise negative for constitutional, skin, eyes, ENT, respiratory, cardiovascular, gastrointestinal, genitourinary, musculoskeletal, psychiatric, hematology/lymphatics, endocrine, allergic/immunologic.    VITALS:  T(C): 36.8 (01-20-22 @ 09:05), Max: 36.8 (01-20-22 @ 09:05)  HR: 85 (01-20-22 @ 09:15) (84 - 85)  BP: 141/92 (01-20-22 @ 09:15) (141/92 - 168/74)  ABP: --  RR: 19 (01-20-22 @ 09:15) (19 - 21)  SpO2: 98% (01-20-22 @ 09:15) (97% - 98%)  CVP(cm H2O): --    GENERAL PHYSICAL EXAM:  GEN: no distress  HEENT: NCAT, OP clear  EYES: sclera white, conjunctiva clear, no nystagmus  NECK: supple  CV: RRR, no murmur     		  PULM: CTAB, no wheezing  ABD: soft, +BS, NT  EXT: peripheral pulse intact, no cyanosis  MSK: muscle tone and strength normal  SKIN: warm, dry    NEUROLOGICAL EXAM:  Mental Status  Orientation: alert and oriented to person, place, time, and situation   Language: clear and fluent    Cranial Nerves  II: full visual fields intact   III, IV, VI: PERRL, EOMI  V, VII: facial sensation and movement intact and symmetric   VIII: hearing intact   IX, X: uvula midline, soft palate elevates normally   XI: BL shoulder shrug intact   XII: tongue midline    Motor  5/5 BUE and BLE                 Tone and bulk are normal in upper and lower limbs  No pronator drift    Sensation  Intact to light touch and pinprick in all 4 EXTs    Reflex  2+ in BL biceps and patella                                    Plantar responses downward bilaterally    Coordination  Normal FTN bilaterally    Gait  Deferred      LABS:                          12.9   6.37  )-----------( 307      ( 20 Jan 2022 09:01 )             39.6     01-20    137  |  101  |  19.9  ----------------------------<  114<H>  4.5   |  21.0<L>  |  0.63    Ca    9.6      20 Jan 2022 09:01    TPro  7.5  /  Alb  5.0  /  TBili  0.7  /  DBili  x   /  AST  22  /  ALT  21  /  AlkPhos  41  01-20    CAPILLARY BLOOD GLUCOSE      POCT Blood Glucose.: 118 mg/dL (20 Jan 2022 08:55)    LIVER FUNCTIONS - ( 20 Jan 2022 09:01 )  Alb: 5.0 g/dL / Pro: 7.5 g/dL / ALK PHOS: 41 U/L / ALT: 21 U/L / AST: 22 U/L / GGT: x           PT/INR - ( 20 Jan 2022 09:01 )   PT: 11.9 sec;   INR: 1.03 ratio         PTT - ( 20 Jan 2022 09:01 )  PTT:27.7 sec        OTHER IMAGING AND STUDIES:      < from: CT Angio Neck w/ IV Cont (01.20.22 @ 09:16) >  IMPRESSION:    Brain CT without contrast:  No evidence of intracranial hemorrhage or mass effect. If clinical   suspicion for acute infarct persists, brain MRI can be obtained if there   is no contraindication.    CT perfusion:  No perfusion abnormality.    CT angiography neck: No hemodynamically significant stenosis of the   bilateral cervical ICAs using NASCET criteria.  Patent vertebral   arteries.  No evidence of vascular dissection.    CT angiography brain: No large vessel occlusion. No evidence of aneurysm.    CT head findings were discussed with Dr. Dimas at 1/20/2022 9:18 AM by   Dr. Darrick Gomez with read back confirmation.

## 2022-01-20 NOTE — ED PROVIDER NOTE - ATTENDING CONTRIBUTION TO CARE
HPI: 52yo male with PMH seizure disorder 2/2 "cortical dysplasia" s/p neurosurgical resection ([facility], neurologist Dr. Oconnell)    PE:  Gen: NAD  Head: NCAT  HEENT: Oral mucosa moist, normal conjunctiva  CV: RRR no murmurs, normal perfusion  Resp: CTA b/l, no wheezing, rales, rhonchi, no respiratory distress  GI: Abd Soft NTND  Neuro: No focal neuro deficits  MSK: FROM all 4 extremities, no deformity  Skin: No rash, no bruising  Psych: Normal affect    MDM:        I performed a history and physical exam of the patient and discussed their management with the resident. I reviewed the resident's note and agree with the documented findings and plan of care. My medical decision making and observations are found above. HPI: 52yo male with PMH seizure disorder 2/2 "cortical dysplasia" s/p neurosurgical resection (Dr. Mcfarland, [facility], neurologist Dr. Oconnell) presenting with difficulty speaking. Started approximately 10 minutes prior to ED arrival. Patient states that he was in elevator when he started to feel lightheaded and felt like he was about to have a seizure. States he is not on any antiepileptic medications. Denies fevers/chills/numbness/tingling weakness.     PE:  Gen: NAD  Head: NCAT  HEENT: Oral mucosa moist, normal conjunctiva  CV: RRR no murmurs, normal perfusion  Resp: CTA b/l, no wheezing, rales, rhonchi, no respiratory distress  GI: Abd Soft NTND  Neuro: No focal neuro deficits  MSK: FROM all 4 extremities, no deformity  Skin: No rash, no bruising  Psych: Normal affect    MDM: Pt with seizure aura followed by tonic/clonic seizure lasting approx 20 seconds. Code stroke called, d/w stroke neurologist, Dr. Veronica, because NIHSS currently 0 and h/o brain surgery and seizures not candidate for tPA as risks outweigh benefits. Given keppra/ativan. pending evaluation by epileptology. Bobbi Dimas DO          I performed a history and physical exam of the patient and discussed their management with the resident. I reviewed the resident's note and agree with the documented findings and plan of care. My medical decision making and observations are found above.

## 2022-01-20 NOTE — ED PROVIDER NOTE - NSICDXPASTMEDICALHX_GEN_ALL_CORE_FT
PAST MEDICAL HISTORY:  Hypertension     Mild intermittent asthma without complication     Mitral valve prolapse

## 2022-01-20 NOTE — ED PROVIDER NOTE - OBJECTIVE STATEMENT
50yo male with history of HTN, asthma, MVP, seizure disorder, s/p temporal lobe mass resection presenting with aphasia and weakness. 50yo male with history of HTN, asthma, MVP, seizure disorder, s/p temporal lobe mass resection presenting with aphasia and weakness s/p witness seizure walking out of elevator and placed in chair. Patient brought to critical. Patient trouble finding words and appears weak. Patient states his last seizure was prior to surgery and was taken off antiepileptic medications. Denies fevers, chills, headache, chest pain, palpitations, shortness of breath, cough, nausea, vomiting, diarrhea, hematuria, dysuria. 50yo male with history of HTN, asthma, MVP, seizure disorder, s/p temporal lobe mass resection (5 years ago) presenting with aphasia and weakness s/p witness seizure walking out of elevator and placed in chair. Patient brought to critical. Patient trouble finding words and appears weak. Patient states his last seizure was prior to surgery and was taken off antiepileptic medications. Denies fevers, chills, headache, chest pain, palpitations, shortness of breath, cough, nausea, vomiting, diarrhea, hematuria, dysuria.

## 2022-01-20 NOTE — ED PROVIDER NOTE - NSICDXFAMILYHX_GEN_ALL_CORE_FT
FAMILY HISTORY:  Father  Still living? Unknown  Family history of TIAs, Age at diagnosis: Age Unknown    Mother  Still living? Unknown  Family history of TIAs, Age at diagnosis: Age Unknown

## 2022-01-20 NOTE — ED PROVIDER NOTE - PATIENT PORTAL LINK FT
You can access the FollowMyHealth Patient Portal offered by [facility] by registering at the following website: [email or web address]. By joining [employer]’s FollowMyHealth portal, you will also be able to view your health information using other applications (apps) compatible with our system.

## 2022-01-20 NOTE — ED ADULT TRIAGE NOTE - CHIEF COMPLAINT QUOTE
pt states he was at work on the elevator, began hyperventilating, then had an episode of difficulty getting his words out.  code stroke called.  GCS 15

## 2022-01-20 NOTE — ED PROVIDER NOTE - PHYSICAL EXAMINATION
General: non-toxic appearing in no acute distress. Alert and cooperative. slight aphasia   Head: Normocephalic, atraumatic.  Eyes: PERRLA. No conjunctival injection. No scleral icterus. EOMI  ENMT: Atraumatic external nose and ears. Moist mucous membranes. Oropharynx clear.  Neck: Soft and supple. Full ROM without pain.   Cardiac: Regular rate and regular rhythm. No murmurs.   Resp: Unlabored respiratory effort. Lungs CTAB. Speaking in full sentences. No wheezes.  Abd: Soft, non-tender, non-distended.   MSK: Spine midline and non-tender.   Skin: Warm and dry.   Neuro: AO x 3. Moves all extremities symmetrically. Motor strength and sensation grossly intact.

## 2022-01-24 DIAGNOSIS — G40.802 OTHER EPILEPSY, NOT INTRACTABLE, W/OUT STATUS EPILEPTICUS: ICD-10-CM

## 2022-02-01 ENCOUNTER — APPOINTMENT (OUTPATIENT)
Dept: NEUROLOGY | Facility: CLINIC | Age: 52
End: 2022-02-01
Payer: COMMERCIAL

## 2022-02-01 DIAGNOSIS — I10 ESSENTIAL (PRIMARY) HYPERTENSION: ICD-10-CM

## 2022-02-01 DIAGNOSIS — Z51.81 ENCOUNTER FOR THERAPEUTIC DRUG LVL MONITORING: ICD-10-CM

## 2022-02-01 DIAGNOSIS — Q04.9 CONGENITAL MALFORMATION OF BRAIN, UNSPECIFIED: ICD-10-CM

## 2022-02-01 DIAGNOSIS — I34.1 NONRHEUMATIC MITRAL (VALVE) PROLAPSE: ICD-10-CM

## 2022-02-01 DIAGNOSIS — Z78.9 OTHER SPECIFIED HEALTH STATUS: ICD-10-CM

## 2022-02-01 PROCEDURE — 99215 OFFICE O/P EST HI 40 MIN: CPT

## 2022-02-03 ENCOUNTER — NON-APPOINTMENT (OUTPATIENT)
Age: 52
End: 2022-02-03

## 2022-02-03 ENCOUNTER — APPOINTMENT (OUTPATIENT)
Dept: NEUROLOGY | Facility: CLINIC | Age: 52
End: 2022-02-03
Payer: COMMERCIAL

## 2022-02-03 PROCEDURE — 93040 RHYTHM ECG WITH REPORT: CPT

## 2022-02-03 PROCEDURE — 95816 EEG AWAKE AND DROWSY: CPT

## 2022-02-04 PROCEDURE — 95700 EEG CONT REC W/VID EEG TECH: CPT

## 2022-02-04 PROCEDURE — 95721 EEG PHY/QHP>36<60 HR W/O VID: CPT

## 2022-02-04 PROCEDURE — 95708 EEG WO VID EA 12-26HR UNMNTR: CPT

## 2022-02-07 ENCOUNTER — NON-APPOINTMENT (OUTPATIENT)
Age: 52
End: 2022-02-07

## 2022-02-14 ENCOUNTER — APPOINTMENT (OUTPATIENT)
Dept: NEUROLOGY | Facility: CLINIC | Age: 52
End: 2022-02-14

## 2022-02-24 NOTE — ASSESSMENT
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 Hernandez, NSG Dr. Murillo [facility]), off antiseizure medication since 2017/2018 who presents to establish care for seizure recurrence on 1/20/22. EEG normal. Personally reviewed all images, labs, EEG and other studies. \par \par Flat affect on LEV 1000mg BID, and continues to have very brief focal aware sz's. Discussed options of switching to LCM vs LTG. Will try LCM first, since LTG will take 3 months to reach therapeutic range. Thoroughly went over side effects of medication, detailed direction and tapering/titrating schedule regarding medication administration. Patient was educated regarding risks and driving privileges associated with the [facility] Guidelines; patient instructed not to drive. All questions and concerns answered and addressed in detail to patient's complete satisfaction. Patient verbalized understanding and agreed to plan.\par \par - Cross-titrate LEV 1000mg BID for LCM 150mg BID. Following schedule printed and provided to patient.\par day 1-3: LCM 50mg QHS, LEV 1000mg BID\par week 1: LCM 50mg BID, LEV 1000mg BID\par week 2: LCM 100mg BID, LEV 500mg BID\par week 3: LCM 150mg BID, LEV 500mg QHS\par week 4 and onward: LCM 150mg BID, stop LEV\par \par - MRI brain w/wo, epilepsy protocol (to be done at [facility] d/t insurance coverage)\par - 48hr aEEG\par - CBC, CMP, LCM trough level at week 5\par - obtain record from Dr. Hernandez's office\par - no driving \par - f/u in 2 months\par \par \par All relevant epilepsy AAN quality care measures were addressed and discussed with the patient.\par \par More than 50% of time spent counseling and educating patient about epilepsy specific safety issues including AED side effects and interactions, alcohol consumption, sleep deprivation, risks and driving privileges associated with the [facility] Guidelines, death related to seizures/SUDEP, seizure 1st aid and risks. Patient is educated on seizure precautions, including no driving, no operating machinery, no swimming or bathing, no climbing heights, or engage in any risky activities during which a seizure could cause further injury to pt or others.

## 2022-02-24 NOTE — HISTORY OF PRESENT ILLNESS
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 Hernandez, NSG Dr. 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. On 6/10/2016, patient was talking to his colleague at work when he suddenly noted difficulty getting the words out. No problem with comprehension or writing. No change in mental status. Sometimes, expressive aphasia accompanied by inability to move his right arm. Symptoms waxed and weaned over next two days, each episode lasting 1-3m. First evaluated in [employer] ER, later transferred to [facility]. Negative stroke workup. MRI brain showed a nonenhancing 1.0 x 0.8 cm lesion in the left anterior temporal cortex. Continuous video EEG at [facility] recorded 2 habitual expressive aphasia without ictal correlate on EEG. Patient was diagnosed with scalp-negative focal seizures and discharged on divalproex sodium . Patient then saw epileptologist Dr. Hernandez at [place]. Patient did not tolerate divalproex sodium DR due to mental cloudiness, so he was switched to lamotrigine, with levetiracetam acting as bridging agent while on up-titration of lamotrigine. Eventually underwent resection of the left temporal cortical lesion in 12/2016 at [place] by NSG Dr. Murillo, which was determined to be cortical dysplasia. After surgery and while on lamotrigine monotherapy, patient continued to have rare very brief aphasic episodes. Sometime end of 2017 or beginning of 2018, patient requested to come off of lamotrigine and was tapered off. Patient does not recall any more aphasic episodes, until 1/20/22. Patient was in the elevator of [employer] when he started to have fluctuating expressive aphasia, followed by a focal to bilateral tonic clonic seizure in ER that lasted ~20s. Routine EEG normal. CTH/CTA/CTP only remarkable for left pterional craniotomy and encephalomalacia and gliosis in the left anterior temporal lobe. Discharged on LEV 1000mg BID, but reports flat affect on LEV.\par \par Has had about 4 very brief focal aware seizures since discharge, 2 of which occurred during the office visit, seconds in duration. Pt was awake and alert, but visibly struggled with expressive language during the seizure.\par \par SEIZURE DESCRIPTION AND TYPE:\par Type #1\par Severity: focal aware seizure \par Onset: 6/20/2016\par Quality & associated signs/symptoms: expressive aphasia +/- inability to move right arm\par Duration: few seconds to few min, may wax and wean for hours\par Timing: no sz off antiseizure medication (LTG discontinued end of 2017 or beginning of 2018) -> recurrence 1/20/22\par Modifying factors: triggered by absence of antiseizure medication \par Circadian variation: no\par \par Type #2\par Severity: focal to bilateral tonic clonic seizure \par Onset: 1/20/22\par Quality & associated signs/symptoms: type #1 -> full body convulsion\par Duration: 20s\par Timing: once\par Modifying factors: triggered by absence of antiseizure medication \par Circadian variation: no\par \par EPILEPSY TYPE: focal epilepsy, 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 No history of febrile seizure, TBI, CNS infection, or FH of seizures.\par \par CURRENT ASM:\par LEV 1000mg BID – restarted 1/20/22, flat affect\par \par PREVIOUS ASM:\par divalproex sodium DR 500mg BID - briefly in 6/2016, cognitive impairment \par levetiracetam - bridging agent when transitioned from divalproex sodium DR to lamotrigine \par lamotrigine - discontinued end of 2017 or beginning of 2018\par \par IMAGING: \par MRI w/ contrast 6/12/16 ([facility]): 1.0 x 0.8 cm anterior left temporal lobe cortical T2/FLAIR hyperintense lesion without abnormal enhancement. The hippocampal structures are symmetric in morphology and signal intensity.\par \par NEUROPHYSIOLOGY:\par rEEG 1/20/22 ([employer] [facility] [employer]): normal A/D/S\par \par cvEEG 6/12 - 6/15/2016 ([facility]): \par - i: Two habitual expressive aphasia captured without ictal correlate on EEG. \par - ii: Normal interictal recording.\par \par NEUROPSYCHOLOGY: \par Has had NPT at [place]. \par \par PMH:\par Hypertension\par Mild intermittent asthma without complication\par Mitral valve prolapse\par \par PSH:\par Resection of left temporal cortical dysplasia 12/2016

## 2022-03-09 ENCOUNTER — APPOINTMENT (OUTPATIENT)
Dept: NEUROLOGY | Facility: CLINIC | Age: 52
End: 2022-03-09
Payer: COMMERCIAL

## 2022-03-09 VITALS
HEART RATE: 89 BPM | TEMPERATURE: 98 F | SYSTOLIC BLOOD PRESSURE: 124 MMHG | HEIGHT: 71 IN | DIASTOLIC BLOOD PRESSURE: 70 MMHG | WEIGHT: 168 LBS | OXYGEN SATURATION: 98 % | BODY MASS INDEX: 23.52 KG/M2

## 2022-03-09 PROCEDURE — 99214 OFFICE O/P EST MOD 30 MIN: CPT

## 2022-03-09 NOTE — ASSESSMENT
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 Hernandez, NSG Dr. Murillo [facility]/[place]), off antiseizure medication since 2017/2018 who presents to establish care for seizure recurrence on 1/20/22. EEG normal. Personally reviewed all images, labs, EEG and other studies. \par \par Increase LCM as planned. All questions and concerns answered and addressed in detail to patient's complete satisfaction. Patient verbalized understanding and agreed to plan.\par \par - increase LCM 150mg daily to 150mg BID, +50mg every 1-2 weeks\par - CBC, CMP, LCM trough level after being on LCM 150mg BID for at least 1.5 weeks\par - f/u in 3 months\par \par \par All relevant epilepsy AAN quality care measures were addressed and discussed with the patient.\par \par More than 50% of time spent counseling and educating patient about epilepsy specific safety issues including AED side effects and interactions, alcohol consumption, sleep deprivation, risks and driving privileges associated with the [place] Guidelines, death related to seizures/SUDEP, seizure 1st aid and risks. Patient is educated on seizure precautions, including instruction not to do following after a sz with impaired awareness - no driving, no operating machinery, no swimming or bathing, no climbing heights, or engage in any risky activities during which a seizure could cause further injury to pt or others.

## 2022-03-09 NOTE — HISTORY OF PRESENT ILLNESS
[FreeTextEntry1] : LAST OFFICE VISIT: 2/1/22\par \par PCP: Dr. Matthews\par \par INTERIM HISTORY:\par MRI brain 2/9/22 with stable postsurgical changes. 48h aEEG 2/3 – 2/5/22 normal. At the last visit, LEV switched to LCM; feels much better after coming off of LEV. Should be on LCM 150mg BID, but only taking it once a day in the morning. 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 Hernandez, NSG Dr. 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 On 6/10/2016, patient was talking to his colleague at work when he suddenly noted difficulty getting the words out. No problem with comprehension or writing. No change in mental status. Sometimes, expressive aphasia accompanied by inability to move his right arm. Symptoms waxed and weaned over next two days, each episode lasting 1-3m. First evaluated in [facility] [employer] ER, later transferred to [facility]. Negative stroke workup. MRI brain showed a nonenhancing 1.0 x 0.8 cm lesion in the left anterior temporal cortex. Continuous video EEG at [facility] recorded 2 habitual expressive aphasia without ictal correlate on EEG. Patient was diagnosed with scalp-negative focal seizures and discharged on divalproex sodium . Patient then saw epileptologist Dr. Hernandez at [place]. Tried VPA, LTG, LEV and LCM. Eventually underwent resection of the left temporal cortical lesion in 12/15/2016 at [place] by [facility] Dr. Murillo, which was determined to be cortical dysplasia. After surgery and while on lamotrigine monotherapy, patient continued to have rare very brief aphasic episodes. Sometime end of 2017 or beginning of 2018, patient requested to come off of lamotrigine and was tapered off. Patient does not recall any more aphasic episodes, until 1/20/22. Patient was in the elevator of [employer] when he started to have fluctuating expressive aphasia, followed by a focal to bilateral tonic clonic seizure in ER that lasted ~20s. Routine EEG normal. CTH/CTA/CTP only remarkable for left pterional craniotomy and encephalomalacia and gliosis in the left anterior temporal lobe. Discharged on LEV 1000mg BID, but reports flat affect on LEV.\par \par Has had about 4 very brief focal aware seizures since discharge, 2 of which occurred during the office visit, seconds in duration. Pt was awake and alert, but visibly struggled with expressive language during the seizure. CURRENT ASM: LEV 1000mg BID – restarted 1/20/22, flat affect.\par \par \par SEIZURE DESCRIPTION AND TYPE:\par Type #1\par Severity: focal aware seizure \par Onset: 6/20/2016\par Quality & associated signs/symptoms: expressive aphasia +/- inability to move right arm\par Duration: few seconds to few min, may wax and wean for hours\par Timing: no sz off antiseizure medication (LTG discontinued end of 2017 or beginning of 2018) -> recurrence 1/20/22, 1-2/week\par Modifying factors: triggered by acute stress\par Circadian variation: no\par \par Type #2\par Severity: focal to bilateral tonic clonic seizure \par Onset: 1/20/22\par Quality & associated signs/symptoms: type #1 -> full body convulsion\par Duration: 20s\par Timing: once\par Modifying factors: triggered by absence of antiseizure medication \par Circadian variation: no\par \par EPILEPSY TYPE: focal epilepsy, 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 No history of febrile seizure, TBI, CNS infection, or FH of seizures.\par \par PREVIOUS ASM:\par VPA DR 500mg BID – June to Dec of 2016, inefficacious \par LTG XR 400mg daily – July to Dec of 2016, resumed 1/2017, inefficacious\par LEV – 12/2016 to 1/2017, 1/2022 to 2/2022, flat affect, drowsiness\par LCM 100mg BID – 12/2016 to 1/2017, diplopia \par \par IMAGING: \par MRI brain w/wo, epilepsy protocol 2/9/22 ([facility]): No change since prior exam. Postsurgical changes within the left anterior temporal lobe including encephalomalacia and gliosis within the anterior temporal lobe.\par \par MRI w/ contrast 6/12/16 ([facility]): 1.0 x 0.8 cm anterior left temporal lobe cortical T2/FLAIR hyperintense lesion without abnormal enhancement. The hippocampal structures are symmetric in morphology and signal intensity.\par \par NEUROPHYSIOLOGY:\par 48h aEEG 2/3 – 2/5/22 ([facility]): normal\par \par rEEG 1/20/22 ([facility]): normal A/D/S\par \par EEG with BL sphenoidal leads 11/28/2016 ([place]):  \par -i: no ictal rhythm despite multiple episodes of FA sz’s (expressive aphasia), off ASM \par -ii: normal interictal\par \par cvEEG 6/12 - 6/15/2016 ([facility]): \par - i: Two habitual expressive aphasia captured without ictal correlate on EEG. \par - ii: Normal interictal recording.\par \par NEUROPSYCHOLOGY: \par Has had NPT at [place].

## 2022-03-09 NOTE — PHYSICAL EXAM
[FreeTextEntry1] : GENERAL PHYSICAL EXAM:\par GEN: no distress, normal affect\par HEENT: NCAT, OP clear\par EYES: sclera white, conjunctiva clear, no nystagmus\par NECK: supple\par CV: RRR    		\par PULM: CTAB, no wheezing\par GI: soft ABD, +BS, NT, ND\par EXT: peripheral pulse intact, no cyanosis\par MSK: muscle tone and strength normal\par SKIN: warm, dry, no rash or lesion on exposed skin \par \par NEUROLOGICAL EXAM:\par Mental Status\par Orientation: alert and oriented to person, place, time, and situation \par Language: clear and fluent\par \par Cranial Nerves\par II: full visual fields intact \par III, IV, VI: PERRL, EOMI\par V, VII: facial sensation and movement intact and symmetric \par VIII: hearing intact \par IX, X: uvula midline, soft palate elevates normally \par XI: BL shoulder shrug intact \par XII: tongue midline\par \par Motor\par 5/5 in RUE, RLE\par 5/5 in LUE, LLE             \par Tone and bulk are normal in upper and lower limbs\par No pronator drift\par \par Sensation\par Intact to light touch and pinprick in all 4 EXTs\par \par Reflex\par 2+ in BL biceps, triceps, brachioradialis, patella, ankle                                    \par Plantar responses downward bilaterally\par \par Coordination\par Normal FTN bilaterally\par \par Gait\par Ambulates independently \par Normal stance, stride, and pivot turn\par Tandem walk intact\par Negative Romberg\par

## 2022-06-13 ENCOUNTER — APPOINTMENT (OUTPATIENT)
Dept: NEUROLOGY | Facility: CLINIC | Age: 52
End: 2022-06-13

## 2022-06-27 NOTE — ED ADULT TRIAGE NOTE - MODE OF ARRIVAL
Walk in 52 year old female with PMH of anxiety presents to Presurgical testing with diagnosis of  abnormal uterine and vaginal bleeding unspecified scheduled for dilation curettage hysteroscopy with myosure.

## 2022-07-01 ENCOUNTER — APPOINTMENT (OUTPATIENT)
Dept: NEUROLOGY | Facility: CLINIC | Age: 52
End: 2022-07-01

## 2022-07-01 VITALS
OXYGEN SATURATION: 97 % | HEIGHT: 71 IN | WEIGHT: 174 LBS | DIASTOLIC BLOOD PRESSURE: 81 MMHG | TEMPERATURE: 98.2 F | BODY MASS INDEX: 24.36 KG/M2 | HEART RATE: 79 BPM | SYSTOLIC BLOOD PRESSURE: 122 MMHG

## 2022-07-01 PROCEDURE — 99214 OFFICE O/P EST MOD 30 MIN: CPT

## 2022-07-14 RX ORDER — LEVETIRACETAM 500 MG/1
500 TABLET, FILM COATED ORAL
Qty: 60 | Refills: 0 | Status: COMPLETED | COMMUNITY
Start: 2022-02-01 | End: 2022-07-14

## 2022-07-14 NOTE — ASSESSMENT
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 Hernandez, NSG Dr. Murillo [facility]), off antiseizure medication since 2017/2018 who presents to establish care for seizure recurrence on 1/20/22. EEG normal. Personally reviewed all images, labs, EEG and other studies. \par \par Continues to have very brief FA sz's. Does not tolerate higher dose of LCM. Will start CNB. All questions and concerns answered and addressed in detail to patient's complete satisfaction. Patient verbalized understanding and agreed to plan.\par \par - Start CNB. Educated pt on side effects, including monitoring for rash daily. Stop taking CNB and call office immediately if rash appears. \par wk 1-2: 12.5mg daily  \par wk 3-4: 25mg daily \par wk 5-6: 50mg daily \par wk 7-8: 100mg daily \par wk 9-10: 150mg daily\par wk 11 and onward: 200mg daily\par \par - continue LCM 150mg BID\par - f/u in 3 months\par \par \par All relevant epilepsy AAN quality care measures were addressed and discussed with the patient.\par \par More than 50% of time spent counseling and educating patient about epilepsy specific safety issues including AED side effects and interactions, alcohol consumption, sleep deprivation, risks and driving privileges associated with the [place] Guidelines, death related to seizures/SUDEP, seizure 1st aid and risks. Patient is educated on seizure precautions, including instruction not to do following after a sz with impaired awareness - no driving, no operating machinery, no swimming or bathing, no climbing heights, or engage in any risky activities during which a seizure could cause further injury to pt or others.

## 2022-08-03 NOTE — HISTORY OF PRESENT ILLNESS
This is a 50-year-old female with history of myocarditis from [place] [place] [facility]\"", PTSD from [place] [address] here with chief complaint of finding a bat in her room while she was working today. She is unsure how long the bat was in the room so she was advised by the health department and PCP to come in for rabies prophylaxis. She denies any recent illness no fever vomiting cough or URI symptoms. 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 Immunization/Injection  Pertinent negatives include no chest pain and no headaches.       Past Medical History:   Diagnosis Date    Adverse effect of anesthesia     BENZOCAINE ALOE VERA DROPS HR INTO THE 30'S    Anemia     Arthritis     Rt  Knee    Basal cell carcinoma 7963    Complication of anesthesia     decreased HR    GERD (gastroesophageal reflux disease)     Headache     had covid 2020    Ill-defined condition     MYOCARDITIS FROM COVID    Ill-defined condition 2020    COVID     Infertility, female     6 rounds of IUI - not for infertility, but no partner    Myocarditis ([facility] [address]     Psychiatric disorder     ANXIETY       Past Surgical History:   Procedure Laterality Date    HX  SECTION      HX GYN  2015    UTERINE POLYP    HX HEENT      WISDOM TEETH REMOVED    HX KNEE ARTHROSCOPY Right 2000    HX MALIGNANT SKIN LESION EXCISION  2019    HX MALIGNANT SKIN LESION EXCISION  10/2019    HX OTHER SURGICAL      SKIN CANCER REMOVED FROM NOSE FOREHEAD NECK CHEST BACK AND STOACH    HX SKIN BIOPSY      Basal cell carcinoma removed from face, nose, L and R arm and abdomen    HX WISDOM TEETH EXTRACTION           Family History:   Problem Relation Age of Onset    Cancer Mother         soft tissue sarcoma    Diabetes Mother     Hypertension Mother     Cancer Father         prostate & skin    Hypertension Father     Bleeding Prob Brother         blood clot after surgery    Deep Vein Thrombosis Brother     Breast Cancer Paternal Grandmother         postmen    Breast Cancer Maternal Aunt         postmen    Anesth Problems Neg Hx        Social History     Socioeconomic History    Marital status: SINGLE     Spouse name: Not on file    Number of children: Not on file    Years of education: Not on file    Highest education level: Not on file   Occupational History    Not on file   Tobacco Use    Smoking status: Never    Smokeless tobacco: Never   Vaping Use    Vaping Use: Never used   Substance and Sexual Activity    Alcohol use: No    Drug use: No    Sexual activity: Not Currently     Partners: Male     Birth control/protection: None   Other Topics Concern    Not on file   Social History Narrative    Not on file     Social Determinants of Health     Financial Resource Strain: Not on file   Food Insecurity: Not on file   Transportation Needs: Not on file   Physical Activity: Not on file   Stress: Not on file   Social Connections: Not on file   Intimate Partner Violence: Not on file   Housing Stability: Not on file         ALLERGIES: Amoxicillin and Anesthetic [benzocaine-aloe vera]    Review of Systems   Constitutional: Negative. Negative for activity change, appetite change and fever. HENT: Negative. Negative for sore throat. Respiratory: Negative. Negative for cough and wheezing. Cardiovascular: Negative. Negative for chest pain. Gastrointestinal: Negative. Negative for diarrhea and vomiting. Genitourinary: Negative. Musculoskeletal: Negative. Negative for back pain and neck pain. Skin: Negative. Negative for rash. Neurological: Negative. Negative for headaches. All other systems reviewed and are negative. Vitals:    08/03/22 1515   Pulse: 67   Resp: 16   Temp: 99.1 °F (37.3 °C)   SpO2: 97%   Weight: 85.3 kg (188 lb)            Physical Exam  Vitals and nursing note reviewed.    Constitutional: General: She is not in acute distress. Appearance: She is well-developed. HENT:      Mouth/Throat:      Pharynx: No oropharyngeal exudate. Cardiovascular:      Rate and Rhythm: Normal rate and regular rhythm. Heart sounds: Normal heart sounds. Pulmonary:      Effort: Pulmonary effort is normal. No respiratory distress. Breath sounds: Normal breath sounds. No wheezing. Abdominal:      General: Bowel sounds are normal. There is no distension. Palpations: Abdomen is soft. Tenderness: There is no abdominal tenderness. There is no guarding or rebound. Musculoskeletal:         General: No tenderness. Normal range of motion. Cervical back: Normal range of motion and neck supple. Lymphadenopathy:      Cervical: No cervical adenopathy. Skin:     General: Skin is warm and dry. Neurological:      General: No focal deficit present. Mental Status: She is alert and oriented to person, place, and time. Mental status is at baseline. Psychiatric:         Mood and Affect: Mood normal.        MDM  Number of Diagnoses or Management Options  Rabies, need for prophylactic vaccination against  Diagnosis management comments: 48-year-old female here with her 2 children and herself for rabies prophylaxis S after finding a bat in the house. She has no other concerning symptoms at the time and no known bite or exposure. She is requesting rabies prophylaxis. Plan: Rabies immunoglobulin and vaccine today. We will have case management consult for outpatient follow-up of vaccines administration. Patient's results have been reviewed with them. Patient and /or family have verbally conveyed understanding and agreement of the patient's signs, symptoms, diagnosis, treatment and prognosis and additionally agree to follow up as recommended or return to the Emergency Department should their condition change prior to follow-up.  Discharge instructions have also been provided to the patient with [FreeTextEntry1] : LAST OFFICE VISIT: 3/9/22\par \par PCP: Dr. Matthews\par \par INTERIM HISTORY:\par Had a few very brief (seconds) episodes of expressive aphasia yesterday at work. 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 Hernandez, NSG Dr. 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 On 6/10/2016, patient was talking to his colleague at work when he suddenly noted difficulty getting the words out. No problem with comprehension or writing. No change in mental status. Sometimes, expressive aphasia accompanied by inability to move his right arm. Symptoms waxed and weaned over next two days, each episode lasting 1-3m. First evaluated in [employer] ER, later transferred to [facility]. Negative stroke workup. MRI brain showed a nonenhancing 1.0 x 0.8 cm lesion in the left anterior temporal cortex. Continuous video EEG at [facility] recorded 2 habitual expressive aphasia without ictal correlate on EEG. Patient was diagnosed with scalp-negative focal seizures and discharged on divalproex sodium DR. Patient then saw epileptologist Dr. Hernandez at [facility]. Tried VPA, LTG, LEV and LCM. Eventually underwent resection of the left temporal cortical lesion in 12/15/2016 at [facility] by NSG Dr. Murillo, which was determined to be cortical dysplasia. After surgery and while on lamotrigine monotherapy, patient continued to have rare very brief aphasic episodes. Sometime end of 2017 or beginning of 2018, patient requested to come off of lamotrigine and was tapered off. Patient does not recall any more aphasic episodes, until 1/20/22. Patient was in the elevator of [employer] when he started to have fluctuating expressive aphasia, followed by a focal to bilateral tonic clonic seizure in ER that lasted ~20s. Routine EEG normal. CTH/CTA/CTP only remarkable for left pterional craniotomy and encephalomalacia and gliosis in the left anterior temporal lobe. Discharged on LEV 1000mg BID, but reports flat affect on LEV.\par \par Has had about 4 very brief focal aware seizures since discharge, 2 of which occurred during the office visit, seconds in duration. Pt was awake and alert, but visibly struggled with expressive language during the seizure. CURRENT ASM: LEV 1000mg BID – restarted 1/20/22, flat affect.\par \par === 3/9/22 ===\par MRI brain 2/9/22 with stable postsurgical changes. 48h aEEG 2/3 – 2/5/22 normal. At the last visit, LEV switched to LCM; feels much better after coming off of LEV. Should be on LCM 150mg BID, but only taking it once a day in the morning. Has about 1-2 episodes of expressive aphasia once a week, seconds in duration. CURRENT ASM: LCM 150mg daily – restarted 2/2022.\par \par \par SEIZURE DESCRIPTION AND TYPE:\par Type #1\par Severity: focal aware seizure \par Onset: 6/20/2016\par Quality & associated signs/symptoms: expressive aphasia +/- inability to move right arm\par Duration: few seconds to few min, may wax and wean for hours\par Timing: no sz off antiseizure medication (LTG discontinued end of 2017 or beginning of 2018) -> recurrence 1/20/22, 1-2/week\par Modifying factors: triggered by acute stress\par Circadian variation: no\par \par Type #2\par Severity: focal to bilateral tonic clonic seizure \par Onset: 1/20/22\par Quality & associated signs/symptoms: type #1 -> full body convulsion\par Duration: 20s\par Timing: once\par Modifying factors: triggered by absence of antiseizure medication \par Circadian variation: no\par \par EPILEPSY TYPE: focal epilepsy, 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 No history of febrile seizure, TBI, CNS infection, or FH of seizures.\par \par PREVIOUS ASM:\par VPA DR 500mg BID – June to Dec of 2016, inefficacious \par LTG XR 400mg daily – July to Dec of 2016, resumed 1/2017, inefficacious\par LEV – 12/2016 to 1/2017, 1/2022 to 2/2022, flat affect, drowsiness\par \par IMAGING: \par MRI brain w/wo, epilepsy protocol 2/9/22 ([facility]): No change since prior exam. Postsurgical changes within the left anterior temporal lobe including encephalomalacia and gliosis within the anterior temporal lobe.\par \par MRI w/ contrast 6/12/16 ([facility]): 1.0 x 0.8 cm anterior left temporal lobe cortical T2/FLAIR hyperintense lesion without abnormal enhancement. The hippocampal structures are symmetric in morphology and signal intensity.\par \par NEUROPHYSIOLOGY:\par 48h aEEG 2/3 – 2/5/22 ([facility]): normal\par \par rEEG 1/20/22 ([facility] [employer] [facility] [employer]): normal A/D/S\par \par EEG with BL sphenoidal leads 11/28/2016 ([facility]):  \par -i: no ictal rhythm despite multiple episodes of FA sz’s (expressive aphasia), off ASM \par -ii: normal interictal\par \par cvEEG 6/12 - 6/15/2016 ([facility]): \par - i: Two habitual expressive aphasia captured without ictal correlate on EEG. \par - ii: Normal interictal recording.\par \par NEUROPSYCHOLOGY: \[facility] Has had NPT at [facility]. some educational information regarding their diagnosis as well as a list of reasons why they would want to return to the ER prior to their follow-up appointment should their condition change.         Risk of Complications, Morbidity, and/or Mortality  Presenting problems: moderate  Diagnostic procedures: moderate  Management options: moderate    Patient Progress  Patient progress: stable         Procedures

## 2022-09-12 RX ORDER — CENOBAMATE 50MG-100MG
14 X 50 MG & KIT ORAL
Qty: 28 | Refills: 0 | Status: COMPLETED | COMMUNITY
Start: 2022-07-01 | End: 2022-09-12

## 2022-09-12 RX ORDER — CENOBAMATE 12.5-25MG
14 X 12.5 MG & KIT ORAL
Qty: 28 | Refills: 0 | Status: COMPLETED | COMMUNITY
Start: 2022-07-01 | End: 2022-09-12

## 2022-10-10 RX ORDER — CENOBAMATE 150-200 MG
14 X 150 MG & KIT ORAL
Qty: 28 | Refills: 0 | Status: COMPLETED | COMMUNITY
Start: 2022-09-12 | End: 2022-10-10

## 2023-01-30 ENCOUNTER — APPOINTMENT (OUTPATIENT)
Dept: NEUROLOGY | Facility: CLINIC | Age: 53
End: 2023-01-30

## 2023-02-06 ENCOUNTER — APPOINTMENT (OUTPATIENT)
Dept: NEUROLOGY | Facility: CLINIC | Age: 53
End: 2023-02-06
Payer: COMMERCIAL

## 2023-02-06 PROCEDURE — 99214 OFFICE O/P EST MOD 30 MIN: CPT

## 2023-02-06 RX ORDER — CENOBAMATE 50 MG/1
50 TABLET, FILM COATED ORAL
Qty: 14 | Refills: 0 | Status: COMPLETED | COMMUNITY
Start: 2022-09-12 | End: 2023-02-06

## 2023-02-06 RX ORDER — CENOBAMATE 200 MG/1
200 TABLET, FILM COATED ORAL
Qty: 60 | Refills: 0 | Status: ACTIVE | COMMUNITY
Start: 2022-12-13 | End: 1900-01-01

## 2023-02-06 RX ORDER — LACOSAMIDE 150 MG/1
150 TABLET, FILM COATED ORAL
Qty: 60 | Refills: 5 | Status: COMPLETED | COMMUNITY
Start: 2022-02-01 | End: 2023-02-06

## 2023-02-06 NOTE — HISTORY OF PRESENT ILLNESS
[FreeTextEntry1] : LAST OFFICE VISIT: 7/1/22\par \par PCP: Dr. Matthews\par \par INTERIM HISTORY:\par Started CNB at the last visit and slowly titrated to 300mg QHS. Due to misunderstanding, pt is no longer on LCM; didn’t reacted well to LCM anyway (flat affect and other mood interference). 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 Hernandez, NSG Dr. 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 On 6/10/2016, patient was talking to his colleague at work when he suddenly noted difficulty getting the words out. No problem with comprehension or writing. No change in mental status. Sometimes, expressive aphasia accompanied by inability to move his right arm. Symptoms waxed and weaned over next two days, each episode lasting 1-3m. First evaluated in [employer] ER, later transferred to [facility]. Negative stroke workup. MRI brain showed a nonenhancing 1.0 x 0.8 cm lesion in the left anterior temporal cortex. Continuous video EEG at [facility] recorded 2 habitual expressive aphasia without ictal correlate on EEG. Patient was diagnosed with scalp-negative focal seizures and discharged on divalproex sodium . Patient then saw epileptologist Dr. Hernandez at [place]. Tried VPA, LTG, LEV and LCM. Eventually underwent resection of the left temporal cortical lesion in 12/15/2016 at [place] by NSG Dr. Murillo, which was determined to be cortical dysplasia. After surgery and while on lamotrigine monotherapy, patient continued to have rare very brief aphasic episodes. Sometime end of 2017 or beginning of 2018, patient requested to come off of lamotrigine and was tapered off. Patient does not recall any more aphasic episodes, until 1/20/22. Patient was in the elevator of [employer] when he started to have fluctuating expressive aphasia, followed by a focal to bilateral tonic clonic seizure in ER that lasted ~20s. Routine EEG normal. CTH/CTA/CTP only remarkable for left pterional craniotomy and encephalomalacia and gliosis in the left anterior temporal lobe. Discharged on LEV 1000mg BID, but reports flat affect on LEV.\par \par Has had about 4 very brief focal aware seizures since discharge, 2 of which occurred during the office visit, seconds in duration. Pt was awake and alert, but visibly struggled with expressive language during the seizure. CURRENT ASM: LEV 1000mg BID – restarted 1/20/22, flat affect.\par \par === 3/9/22 ===\par MRI brain 2/9/22 with stable postsurgical changes. 48h aEEG 2/3 – 2/5/22 normal. At the last visit, LEV switched to LCM; feels much better after coming off of LEV. Should be on LCM 150mg BID, but only taking it once a day in the morning. Has about 1-2 episodes of expressive aphasia once a week, seconds in duration. CURRENT ASM: LCM 150mg daily – restarted 2/2022.\par \par === 7/1/22 ===\par Had a few very brief (seconds) episodes of expressive aphasia yesterday at work. No obvious trigger. CURRENT ASM: LCM 150mg daily – restarted 2/2022.\par \par \par SEIZURE DESCRIPTION AND TYPE:\par Type #1\par Severity: focal aware seizure \par Onset: 6/20/2016\par Quality & associated signs/symptoms: expressive aphasia +/- inability to move right arm\par Duration: few seconds to few min, may wax and wean for hours\par Timing: no sz off antiseizure medication (LTG discontinued end of 2017 or beginning of 2018) -> recurrence 1/20/22 -> few per month\par Modifying factors: triggered by acute stress\par Circadian variation: no\par \par Type #2\par Severity: focal to bilateral tonic clonic seizure \par Onset: 1/20/22\par Quality & associated signs/symptoms: type #1 -> full body convulsion\par Duration: 20s\par Timing: once\par Modifying factors: triggered by absence of antiseizure medication \par Circadian variation: no\par \par EPILEPSY TYPE: focal epilepsy, 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 No history of febrile seizure, TBI, CNS infection, or FH of seizures.\par \par PREVIOUS ASM:\par VPA DR 500mg BID – June to Dec of 2016, inefficacious \par LTG XR 400mg daily – July to Dec of 2016, resumed 1/2017, inefficacious\par LEV – 12/2016 to 1/2017, 1/2022 to 2/2022, flat affect, drowsiness\par LCM - restarted 2/2022, switched to CNB, flat affect\par \par IMAGING: \par MRI brain w/wo, epilepsy protocol 2/9/22 ([facility]): No change since prior exam. Postsurgical changes within the left anterior temporal lobe including encephalomalacia and gliosis within the anterior temporal lobe.\par \par MRI w/ contrast 6/12/16 ([facility]): 1.0 x 0.8 cm anterior left temporal lobe cortical T2/FLAIR hyperintense lesion without abnormal enhancement. The hippocampal structures are symmetric in morphology and signal intensity.\par \par NEUROPHYSIOLOGY:\par 48h aEEG 2/3 – 2/5/22 ([facility]): normal\par \par rEEG 1/20/22 ([facility]): normal A/D/S\par \par EEG with BL sphenoidal leads 11/28/2016 ([place]):  \par -i: no ictal rhythm despite multiple episodes of FA sz’s (expressive aphasia), off ASM \par -ii: normal interictal\par \par cvEEG 6/12 - 6/15/2016 ([facility]): \par - i: Two habitual expressive aphasia captured without ictal correlate on EEG. \par - ii: Normal interictal recording.\par \par NEUROPSYCHOLOGY: \par Has had NPT at [place].

## 2023-02-06 NOTE — ASSESSMENT
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 Hernandez, NSG Dr. Lidia CARDENAS/[place]), off antiseizure medication since 2017/2018 who presents to establish care for seizure recurrence on 1/20/22. EEG normal. Personally reviewed all images, labs, EEG and other studies. \par \par Continues to have very brief FA sz's. Increase CNB. If second ASM is needed in the future, pt would like to resume LTG. All questions and concerns answered and addressed in detail to patient's complete satisfaction. Patient verbalized understanding and agreed to plan.\par \par - increase CNB 300mg QHS to 350mg QHS x2 weeks -> 400mg QHS\par - f/u in 3 months\par \par \par All relevant epilepsy AAN quality care measures were addressed and discussed with the patient.\par \par More than 50% of time spent counseling and educating patient about epilepsy specific safety issues including AED side effects and interactions, alcohol consumption, sleep deprivation, risks and driving privileges associated with the [facility] [place] Guidelines, death related to seizures/SUDEP, seizure 1st aid and risks. Patient is educated on seizure precautions, including instruction not to do following after a sz with impaired awareness - no driving, no operating machinery, no swimming or bathing, no climbing heights, or engage in any risky activities during which a seizure could cause further injury to pt or others.

## 2023-05-15 ENCOUNTER — APPOINTMENT (OUTPATIENT)
Dept: NEUROLOGY | Facility: CLINIC | Age: 53
End: 2023-05-15

## 2023-08-22 NOTE — ED ADULT TRIAGE NOTE - HEIGHT IN FEET
Outgoing call to the patient - rx resent per patient request so pharmacy can fill correctly.     Thanks  Erinn      5

## 2024-02-07 ENCOUNTER — INPATIENT (INPATIENT)
Facility: HOSPITAL | Age: 54
LOS: 0 days | Discharge: HOME | DRG: 605 | End: 2024-02-08
Attending: STUDENT IN AN ORGANIZED HEALTH CARE EDUCATION/TRAINING PROGRAM | Admitting: STUDENT IN AN ORGANIZED HEALTH CARE EDUCATION/TRAINING PROGRAM
Payer: COMMERCIAL

## 2024-02-07 ENCOUNTER — TRANSCRIPTION ENCOUNTER (OUTPATIENT)
Age: 54
End: 2024-02-07

## 2024-02-07 VITALS
DIASTOLIC BLOOD PRESSURE: 76 MMHG | HEART RATE: 64 BPM | RESPIRATION RATE: 20 BRPM | OXYGEN SATURATION: 98 % | SYSTOLIC BLOOD PRESSURE: 126 MMHG

## 2024-02-07 DIAGNOSIS — S70.10XA CONTUSION OF UNSPECIFIED THIGH, INITIAL ENCOUNTER: ICD-10-CM

## 2024-02-07 DIAGNOSIS — F07.0 PERSONALITY CHANGE DUE TO KNOWN PHYSIOLOGICAL CONDITION: Chronic | ICD-10-CM

## 2024-02-07 LAB
ALBUMIN SERPL ELPH-MCNC: 4 G/DL — SIGNIFICANT CHANGE UP (ref 3.3–5.2)
ALP SERPL-CCNC: 26 U/L — LOW (ref 40–120)
ALT FLD-CCNC: 23 U/L — SIGNIFICANT CHANGE UP
ANION GAP SERPL CALC-SCNC: 14 MMOL/L — SIGNIFICANT CHANGE UP (ref 5–17)
APPEARANCE UR: CLEAR — SIGNIFICANT CHANGE UP
APTT BLD: 22.9 SEC — LOW (ref 24.5–35.6)
AST SERPL-CCNC: 25 U/L — SIGNIFICANT CHANGE UP
BACTERIA # UR AUTO: NEGATIVE /HPF — SIGNIFICANT CHANGE UP
BASOPHILS # BLD AUTO: 0.04 K/UL — SIGNIFICANT CHANGE UP (ref 0–0.2)
BASOPHILS NFR BLD AUTO: 0.5 % — SIGNIFICANT CHANGE UP (ref 0–2)
BILIRUB SERPL-MCNC: 0.7 MG/DL — SIGNIFICANT CHANGE UP (ref 0.4–2)
BILIRUB UR-MCNC: NEGATIVE — SIGNIFICANT CHANGE UP
BUN SERPL-MCNC: 8.7 MG/DL — SIGNIFICANT CHANGE UP (ref 8–20)
CALCIUM SERPL-MCNC: 7.7 MG/DL — LOW (ref 8.4–10.5)
CAST: 1 /LPF — SIGNIFICANT CHANGE UP (ref 0–4)
CHLORIDE SERPL-SCNC: 110 MMOL/L — HIGH (ref 96–108)
CO2 SERPL-SCNC: 23 MMOL/L — SIGNIFICANT CHANGE UP (ref 22–29)
COLOR SPEC: YELLOW — SIGNIFICANT CHANGE UP
CREAT SERPL-MCNC: 0.76 MG/DL — SIGNIFICANT CHANGE UP (ref 0.5–1.3)
DIFF PNL FLD: NEGATIVE — SIGNIFICANT CHANGE UP
EGFR: 107 ML/MIN/1.73M2 — SIGNIFICANT CHANGE UP
EOSINOPHIL # BLD AUTO: 0 K/UL — SIGNIFICANT CHANGE UP (ref 0–0.5)
EOSINOPHIL NFR BLD AUTO: 0 % — SIGNIFICANT CHANGE UP (ref 0–6)
GLUCOSE SERPL-MCNC: 95 MG/DL — SIGNIFICANT CHANGE UP (ref 70–99)
GLUCOSE UR QL: NEGATIVE MG/DL — SIGNIFICANT CHANGE UP
HCT VFR BLD CALC: 37.2 % — LOW (ref 39–50)
HGB BLD-MCNC: 12.8 G/DL — LOW (ref 13–17)
IMM GRANULOCYTES NFR BLD AUTO: 0.3 % — SIGNIFICANT CHANGE UP (ref 0–0.9)
INR BLD: 1.04 RATIO — SIGNIFICANT CHANGE UP (ref 0.85–1.18)
KETONES UR-MCNC: NEGATIVE MG/DL — SIGNIFICANT CHANGE UP
LEUKOCYTE ESTERASE UR-ACNC: NEGATIVE — SIGNIFICANT CHANGE UP
LYMPHOCYTES # BLD AUTO: 1.84 K/UL — SIGNIFICANT CHANGE UP (ref 1–3.3)
LYMPHOCYTES # BLD AUTO: 24.3 % — SIGNIFICANT CHANGE UP (ref 13–44)
MCHC RBC-ENTMCNC: 30.7 PG — SIGNIFICANT CHANGE UP (ref 27–34)
MCHC RBC-ENTMCNC: 34.4 GM/DL — SIGNIFICANT CHANGE UP (ref 32–36)
MCV RBC AUTO: 89.2 FL — SIGNIFICANT CHANGE UP (ref 80–100)
MONOCYTES # BLD AUTO: 0.78 K/UL — SIGNIFICANT CHANGE UP (ref 0–0.9)
MONOCYTES NFR BLD AUTO: 10.3 % — SIGNIFICANT CHANGE UP (ref 2–14)
NEUTROPHILS # BLD AUTO: 4.88 K/UL — SIGNIFICANT CHANGE UP (ref 1.8–7.4)
NEUTROPHILS NFR BLD AUTO: 64.6 % — SIGNIFICANT CHANGE UP (ref 43–77)
NITRITE UR-MCNC: NEGATIVE — SIGNIFICANT CHANGE UP
PH UR: 7.5 — SIGNIFICANT CHANGE UP (ref 5–8)
PLATELET # BLD AUTO: 220 K/UL — SIGNIFICANT CHANGE UP (ref 150–400)
POTASSIUM SERPL-MCNC: 3.7 MMOL/L — SIGNIFICANT CHANGE UP (ref 3.5–5.3)
POTASSIUM SERPL-SCNC: 3.7 MMOL/L — SIGNIFICANT CHANGE UP (ref 3.5–5.3)
PROT SERPL-MCNC: 5.8 G/DL — LOW (ref 6.6–8.7)
PROT UR-MCNC: 30 MG/DL
PROTHROM AB SERPL-ACNC: 11.5 SEC — SIGNIFICANT CHANGE UP (ref 9.5–13)
RBC # BLD: 4.17 M/UL — LOW (ref 4.2–5.8)
RBC # FLD: 12.1 % — SIGNIFICANT CHANGE UP (ref 10.3–14.5)
RBC CASTS # UR COMP ASSIST: 0 /HPF — SIGNIFICANT CHANGE UP (ref 0–4)
SODIUM SERPL-SCNC: 147 MMOL/L — HIGH (ref 135–145)
SP GR SPEC: 1.02 — SIGNIFICANT CHANGE UP (ref 1–1.03)
SQUAMOUS # UR AUTO: 0 /HPF — SIGNIFICANT CHANGE UP (ref 0–5)
UROBILINOGEN FLD QL: 0.2 MG/DL — SIGNIFICANT CHANGE UP (ref 0.2–1)
WBC # BLD: 7.56 K/UL — SIGNIFICANT CHANGE UP (ref 3.8–10.5)
WBC # FLD AUTO: 7.56 K/UL — SIGNIFICANT CHANGE UP (ref 3.8–10.5)
WBC UR QL: 0 /HPF — SIGNIFICANT CHANGE UP (ref 0–5)

## 2024-02-07 PROCEDURE — 70450 CT HEAD/BRAIN W/O DYE: CPT | Mod: 26,ME

## 2024-02-07 PROCEDURE — 75635 CT ANGIO ABDOMINAL ARTERIES: CPT | Mod: 26,MD

## 2024-02-07 PROCEDURE — 73552 X-RAY EXAM OF FEMUR 2/>: CPT | Mod: 26,RT

## 2024-02-07 PROCEDURE — 72170 X-RAY EXAM OF PELVIS: CPT | Mod: 26

## 2024-02-07 PROCEDURE — G1004: CPT

## 2024-02-07 PROCEDURE — 71045 X-RAY EXAM CHEST 1 VIEW: CPT | Mod: 26

## 2024-02-07 PROCEDURE — 99291 CRITICAL CARE FIRST HOUR: CPT

## 2024-02-07 PROCEDURE — 99284 EMERGENCY DEPT VISIT MOD MDM: CPT

## 2024-02-07 RX ORDER — TETANUS TOXOID, REDUCED DIPHTHERIA TOXOID AND ACELLULAR PERTUSSIS VACCINE, ADSORBED 5; 2.5; 8; 8; 2.5 [IU]/.5ML; [IU]/.5ML; UG/.5ML; UG/.5ML; UG/.5ML
0.5 SUSPENSION INTRAMUSCULAR ONCE
Refills: 0 | Status: COMPLETED | OUTPATIENT
Start: 2024-02-07 | End: 2024-02-07

## 2024-02-07 RX ORDER — CEFAZOLIN SODIUM 1 G
2000 VIAL (EA) INJECTION ONCE
Refills: 0 | Status: COMPLETED | OUTPATIENT
Start: 2024-02-07 | End: 2024-02-07

## 2024-02-07 RX ORDER — ACETAMINOPHEN 500 MG
650 TABLET ORAL EVERY 6 HOURS
Refills: 0 | Status: DISCONTINUED | OUTPATIENT
Start: 2024-02-07 | End: 2024-02-08

## 2024-02-07 RX ORDER — CEFAZOLIN SODIUM 1 G
2000 VIAL (EA) INJECTION ONCE
Refills: 0 | Status: DISCONTINUED | OUTPATIENT
Start: 2024-02-07 | End: 2024-02-07

## 2024-02-07 RX ORDER — SODIUM CHLORIDE 9 MG/ML
1000 INJECTION INTRAMUSCULAR; INTRAVENOUS; SUBCUTANEOUS
Refills: 0 | Status: DISCONTINUED | OUTPATIENT
Start: 2024-02-07 | End: 2024-02-08

## 2024-02-07 RX ADMIN — Medication 2000 MILLIGRAM(S): at 19:24

## 2024-02-07 RX ADMIN — TETANUS TOXOID, REDUCED DIPHTHERIA TOXOID AND ACELLULAR PERTUSSIS VACCINE, ADSORBED 0.5 MILLILITER(S): 5; 2.5; 8; 8; 2.5 SUSPENSION INTRAMUSCULAR at 19:24

## 2024-02-07 RX ADMIN — SODIUM CHLORIDE 75 MILLILITER(S): 9 INJECTION INTRAMUSCULAR; INTRAVENOUS; SUBCUTANEOUS at 20:36

## 2024-02-07 NOTE — H&P ADULT - NS ATTEND AMEND GEN_ALL_CORE FT
I have seen and examined this patient.  52M presents as a level A trauma.  He was working in his basement when a vise fell onto his groin.  Per EMS, there was a large amount of blood at the scene.  Airway and breathing are intact, BP/HR are WNL, and his RLE had a tourniquet on it with a hematoma in the anterior thigh.  Once the tourniquet was taken down, there was no active bleeding seen.  CTA RLE shows a hematoma with a small amount of active extravasation.  He will require admission, leg wrap, and repeat CBC.

## 2024-02-07 NOTE — ED PROVIDER NOTE - CLINICAL SUMMARY MEDICAL DECISION MAKING FREE TEXT BOX
labs reviewed. Pt trauma A activation on arrival as patient was hypotensive with concern for arterial injury.  primary survey intact.  Pt given ancef and tdap.  tourniquet taken down with no active bleeding. Pt taken to CT and had no active extravasation on CT.  Case d/w trauma attending, dr. castro, who will admit for further management.

## 2024-02-07 NOTE — ED ADULT TRIAGE NOTE - CHIEF COMPLAINT QUOTE
Patient presents to ER with laceration/puncture injury to right upper leg while working at home, EMS reports tourniquete applied around groin area due to heavy bleeding, patient hypotensive in field, patient lethargic, resp even/unlabored, Trauma A activated.

## 2024-02-07 NOTE — ED PROVIDER NOTE - OBJECTIVE STATEMENT
Pt is a 53 yo M BIBEMS from home for R leg trauma. PT was working tonight when a large vice fell onto his R thigh. EMS reported patient bleeding heavily at scene and unable to control bleeding so tourniquet was placed. no other injuries. Pt denies head injury.

## 2024-02-07 NOTE — ED ADULT NURSE NOTE - FINAL NURSING ELECTRONIC SIGNATURE
Dr Reyes Sanchez at bedside assessing patient, discussing pt with wife at bedside for possible EJ for better access.       Deann Camilo RN  10/09/23 0891 07-Feb-2024 20:49

## 2024-02-07 NOTE — H&P ADULT - NSHPPHYSICALEXAM_GEN_ALL_CORE
Neuro: GCS 15, CN2-12 intact, pupils equal and reactive  Neck: No JVD  Chest: no anterior chest tenderness or crepitus  Respiratory: bilateral breath sounds, nonlabored breathing  Abdomen: soft, nontender, nondistended  : no blood at the meatus  Extremities: RLE puncture wound to medial side of thigh, palpable medial thigh hematoma non pulsatile

## 2024-02-07 NOTE — H&P ADULT - ASSESSMENT
A: 53yo M presents as a level A trauma after a 60-70lb vice fell on his right leg, punctured it, and now has a hematoma and also vasovagaled during the assessment    Plan:  -Admit to trauma service  -Incentive Spirometry 10x an hour  -Multimodal pain control  -REgular diet  -IVF  -Tylenol for pain control  -No acute surgical intervention  -F/u X-rays  -F/u CT scans - No evidence of vascular injury or vascular stenosis. Posttraumatic changes in the medial distal right thigh

## 2024-02-07 NOTE — H&P ADULT - HISTORY OF PRESENT ILLNESS
51yo M presents as a level A trauma - reports a vice roughly 60-70 lbs fell on his leg. He reports possible loss of consciousness but no head strike. He reported only PMH of HTN and surgery via neurosurgery for corticol dysplasia where they removed part of his temporal lobe of his brain. Denies any allergies to medications. Pain on right leg, tourniquet applied in field - removed during trauma.     A: airway intact  B: bilateral breath sounds  C: bilateral femoral pulses  D: GCS 15, pupils 3mm equal and reactive  E: RLE puncture wound to the medial aspect of the thigh, palpable medial hematoma but not pulsatile. LLE no obvious deformities    Note patient vasovagaled during assessment

## 2024-02-07 NOTE — ED PROVIDER NOTE - PHYSICAL EXAMINATION
Constitutional - well-developed.   Head - NCAT. Airway patent.   Eyes - PERRL.   CV - RRR. no murmur. no edema.   Pulm - CTAB.   Abd - soft, nt. no rebound. no guarding.   Neuro - A&Ox3. strength 5/5 x4. sensation intact x4.   Skin - No rash. .  MSK - normal ROM. R thigh hematoma. nvi distally after tourniquet taken down.  small wound. no active bleeding after taking down tourniquet.

## 2024-02-08 ENCOUNTER — TRANSCRIPTION ENCOUNTER (OUTPATIENT)
Age: 54
End: 2024-02-08

## 2024-02-08 VITALS
SYSTOLIC BLOOD PRESSURE: 120 MMHG | HEART RATE: 70 BPM | DIASTOLIC BLOOD PRESSURE: 66 MMHG | OXYGEN SATURATION: 95 % | RESPIRATION RATE: 18 BRPM | TEMPERATURE: 98 F

## 2024-02-08 LAB
ALBUMIN SERPL ELPH-MCNC: 3.9 G/DL — SIGNIFICANT CHANGE UP (ref 3.3–5.2)
ALP SERPL-CCNC: 29 U/L — LOW (ref 40–120)
ALT FLD-CCNC: 20 U/L — SIGNIFICANT CHANGE UP
ANION GAP SERPL CALC-SCNC: 16 MMOL/L — SIGNIFICANT CHANGE UP (ref 5–17)
AST SERPL-CCNC: 20 U/L — SIGNIFICANT CHANGE UP
BASOPHILS # BLD AUTO: 0.03 K/UL — SIGNIFICANT CHANGE UP (ref 0–0.2)
BASOPHILS NFR BLD AUTO: 0.4 % — SIGNIFICANT CHANGE UP (ref 0–2)
BILIRUB SERPL-MCNC: 1.5 MG/DL — SIGNIFICANT CHANGE UP (ref 0.4–2)
BUN SERPL-MCNC: 8.5 MG/DL — SIGNIFICANT CHANGE UP (ref 8–20)
CALCIUM SERPL-MCNC: 8 MG/DL — LOW (ref 8.4–10.5)
CHLORIDE SERPL-SCNC: 103 MMOL/L — SIGNIFICANT CHANGE UP (ref 96–108)
CO2 SERPL-SCNC: 23 MMOL/L — SIGNIFICANT CHANGE UP (ref 22–29)
CREAT SERPL-MCNC: 0.69 MG/DL — SIGNIFICANT CHANGE UP (ref 0.5–1.3)
EGFR: 111 ML/MIN/1.73M2 — SIGNIFICANT CHANGE UP
EOSINOPHIL # BLD AUTO: 0 K/UL — SIGNIFICANT CHANGE UP (ref 0–0.5)
EOSINOPHIL NFR BLD AUTO: 0 % — SIGNIFICANT CHANGE UP (ref 0–6)
GLUCOSE SERPL-MCNC: 106 MG/DL — HIGH (ref 70–99)
HCT VFR BLD CALC: 33.5 % — LOW (ref 39–50)
HGB BLD-MCNC: 11.5 G/DL — LOW (ref 13–17)
IMM GRANULOCYTES NFR BLD AUTO: 0.3 % — SIGNIFICANT CHANGE UP (ref 0–0.9)
LYMPHOCYTES # BLD AUTO: 1.07 K/UL — SIGNIFICANT CHANGE UP (ref 1–3.3)
LYMPHOCYTES # BLD AUTO: 14.6 % — SIGNIFICANT CHANGE UP (ref 13–44)
MAGNESIUM SERPL-MCNC: 1.4 MG/DL — LOW (ref 1.6–2.6)
MCHC RBC-ENTMCNC: 30.5 PG — SIGNIFICANT CHANGE UP (ref 27–34)
MCHC RBC-ENTMCNC: 34.3 GM/DL — SIGNIFICANT CHANGE UP (ref 32–36)
MCV RBC AUTO: 88.9 FL — SIGNIFICANT CHANGE UP (ref 80–100)
MONOCYTES # BLD AUTO: 0.77 K/UL — SIGNIFICANT CHANGE UP (ref 0–0.9)
MONOCYTES NFR BLD AUTO: 10.5 % — SIGNIFICANT CHANGE UP (ref 2–14)
NEUTROPHILS # BLD AUTO: 5.44 K/UL — SIGNIFICANT CHANGE UP (ref 1.8–7.4)
NEUTROPHILS NFR BLD AUTO: 74.2 % — SIGNIFICANT CHANGE UP (ref 43–77)
PHOSPHATE SERPL-MCNC: 3.1 MG/DL — SIGNIFICANT CHANGE UP (ref 2.4–4.7)
PLATELET # BLD AUTO: 186 K/UL — SIGNIFICANT CHANGE UP (ref 150–400)
POTASSIUM SERPL-MCNC: 3.7 MMOL/L — SIGNIFICANT CHANGE UP (ref 3.5–5.3)
POTASSIUM SERPL-SCNC: 3.7 MMOL/L — SIGNIFICANT CHANGE UP (ref 3.5–5.3)
PROT SERPL-MCNC: 5.8 G/DL — LOW (ref 6.6–8.7)
RBC # BLD: 3.77 M/UL — LOW (ref 4.2–5.8)
RBC # FLD: 12.4 % — SIGNIFICANT CHANGE UP (ref 10.3–14.5)
SODIUM SERPL-SCNC: 142 MMOL/L — SIGNIFICANT CHANGE UP (ref 135–145)
WBC # BLD: 7.33 K/UL — SIGNIFICANT CHANGE UP (ref 3.8–10.5)
WBC # FLD AUTO: 7.33 K/UL — SIGNIFICANT CHANGE UP (ref 3.8–10.5)

## 2024-02-08 PROCEDURE — 84100 ASSAY OF PHOSPHORUS: CPT

## 2024-02-08 PROCEDURE — 72170 X-RAY EXAM OF PELVIS: CPT

## 2024-02-08 PROCEDURE — 93005 ELECTROCARDIOGRAM TRACING: CPT

## 2024-02-08 PROCEDURE — 86900 BLOOD TYPING SEROLOGIC ABO: CPT

## 2024-02-08 PROCEDURE — 86850 RBC ANTIBODY SCREEN: CPT

## 2024-02-08 PROCEDURE — 75635 CT ANGIO ABDOMINAL ARTERIES: CPT | Mod: MD

## 2024-02-08 PROCEDURE — 81001 URINALYSIS AUTO W/SCOPE: CPT

## 2024-02-08 PROCEDURE — 71045 X-RAY EXAM CHEST 1 VIEW: CPT

## 2024-02-08 PROCEDURE — 99285 EMERGENCY DEPT VISIT HI MDM: CPT | Mod: 25

## 2024-02-08 PROCEDURE — 85025 COMPLETE CBC W/AUTO DIFF WBC: CPT

## 2024-02-08 PROCEDURE — 85730 THROMBOPLASTIN TIME PARTIAL: CPT

## 2024-02-08 PROCEDURE — 90715 TDAP VACCINE 7 YRS/> IM: CPT

## 2024-02-08 PROCEDURE — 85610 PROTHROMBIN TIME: CPT

## 2024-02-08 PROCEDURE — G1004: CPT

## 2024-02-08 PROCEDURE — 73552 X-RAY EXAM OF FEMUR 2/>: CPT

## 2024-02-08 PROCEDURE — 86901 BLOOD TYPING SEROLOGIC RH(D): CPT

## 2024-02-08 PROCEDURE — 80053 COMPREHEN METABOLIC PANEL: CPT

## 2024-02-08 PROCEDURE — 36415 COLL VENOUS BLD VENIPUNCTURE: CPT

## 2024-02-08 PROCEDURE — 83735 ASSAY OF MAGNESIUM: CPT

## 2024-02-08 PROCEDURE — 93010 ELECTROCARDIOGRAM REPORT: CPT

## 2024-02-08 PROCEDURE — 90471 IMMUNIZATION ADMIN: CPT

## 2024-02-08 PROCEDURE — 96374 THER/PROPH/DIAG INJ IV PUSH: CPT

## 2024-02-08 PROCEDURE — 70450 CT HEAD/BRAIN W/O DYE: CPT | Mod: ME

## 2024-02-08 PROCEDURE — 99238 HOSP IP/OBS DSCHRG MGMT 30/<: CPT

## 2024-02-08 RX ORDER — MAGNESIUM SULFATE 500 MG/ML
2 VIAL (ML) INJECTION ONCE
Refills: 0 | Status: COMPLETED | OUTPATIENT
Start: 2024-02-08 | End: 2024-02-08

## 2024-02-08 RX ORDER — POTASSIUM CHLORIDE 20 MEQ
40 PACKET (EA) ORAL ONCE
Refills: 0 | Status: COMPLETED | OUTPATIENT
Start: 2024-02-08 | End: 2024-02-08

## 2024-02-08 RX ORDER — INFLUENZA VIRUS VACCINE 15; 15; 15; 15 UG/.5ML; UG/.5ML; UG/.5ML; UG/.5ML
0.5 SUSPENSION INTRAMUSCULAR ONCE
Refills: 0 | Status: DISCONTINUED | OUTPATIENT
Start: 2024-02-08 | End: 2024-02-08

## 2024-02-08 RX ADMIN — Medication 40 MILLIEQUIVALENT(S): at 11:10

## 2024-02-08 RX ADMIN — Medication 650 MILLIGRAM(S): at 06:20

## 2024-02-08 RX ADMIN — Medication 25 GRAM(S): at 11:10

## 2024-02-08 RX ADMIN — Medication 650 MILLIGRAM(S): at 05:19

## 2024-02-08 RX ADMIN — Medication 650 MILLIGRAM(S): at 11:51

## 2024-02-08 RX ADMIN — Medication 650 MILLIGRAM(S): at 11:10

## 2024-02-08 NOTE — DISCHARGE NOTE PROVIDER - HOSPITAL COURSE
53yo M presents as a level A trauma - reports a vice roughly 60-70 lbs fell on his leg. He reports possible loss of consciousness but no head strike. Pain on right leg, tourniquet applied in field - removed during trauma. RLE puncture wound to the medial aspect of the thigh, palpable medial hematoma but not pulsatile.    Admitted to hospital. Leg wrapped with compressive ace dressing. AM CBC shows stable hemoglobin. Pt ambulating without difficulty. No bleeding from wound. Stable for discharge home.

## 2024-02-08 NOTE — DISCHARGE NOTE NURSING/CASE MANAGEMENT/SOCIAL WORK - NSDCVIVACCINE_GEN_ALL_CORE_FT
Tdap; 07-Feb-2024 19:24; Juno Gipson (RN); [employer]; [id number] (Exp. Date: 20-Jul-2025); IntraMuscular; Deltoid Right.; 0.5 milliLiter(s); VIS (VIS Published: 09-May-2013, VIS Presented: 07-Feb-2024);

## 2024-02-08 NOTE — DISCHARGE NOTE NURSING/CASE MANAGEMENT/SOCIAL WORK - NSDCPEFALRISK_GEN_ALL_CORE
For information on Fall & Injury Prevention, visit: [email or web address] OR  [email or web address] OR  https://www.cdc.gov/steadi/patient.html

## 2024-02-08 NOTE — CHART NOTE - NSCHARTNOTEFT_GEN_A_CORE
Tertiary Trauma Survey (TTS)    Date of TTS: 02-08-24 @ 10:41                             Admit Date: 02-07-24 @ 20:27      Trauma Activation: A    List Injuries Identified to Date: RLE thigh puncture and hematoma        List Operative and Interventional Radiological Procedures:           Physical Exam:    Neuro: AAOx3    HEENT: normocephalic, atraumatic    Pulm/Chest: unlabored breathing    Cardiac: RRR    GI / Abdomen: soft, nondistended, nontender    Musculoskeletal / Extremities: RLE with puncture wound medial distal thigh, nonbleeding, ecchymosis and hematoma, leg wrapped with compressive ace      RADIOLOGICAL FINDINGS REVIEW: No evidence of vascular injury or vascular stenosis. Posttraumatic   changes in the medial distal right thigh. No definite evidence of active bleeding.    INCIDENTAL FINDINGS:     [X] No    [ ] Yes, Findings are:        [X] Tertiary exam complete, there are no new injuries identified    [ ] Tertiary exam done, new injuries identified are:                [ ] Imaging ordered:

## 2024-02-08 NOTE — PROGRESS NOTE ADULT - ASSESSMENT
51yo M presents as a level A trauma after a 60-70lb vice fell on his right leg, punctured it, and now has a hematoma and also vasovagaled during the assessment.     Plan:  - H/H stable  - Pain well controlled, hematoma nonexpanding  - RLE NVI  - Compressive dressing to RLE  - Stable for discharge

## 2024-02-08 NOTE — PROGRESS NOTE ADULT - SUBJECTIVE AND OBJECTIVE BOX
Subjective: Pt evaluated at bedside this AM. Resting in NAD. Pt ambulating freely. Complains of mild pain in the RLE.      MEDICATIONS  (STANDING):  influenza   Vaccine 0.5 milliLiter(s) IntraMuscular once  sodium chloride 0.9%. 1000 milliLiter(s) (75 mL/Hr) IV Continuous <Continuous>    MEDICATIONS  (PRN):  acetaminophen     Tablet .. 650 milliGRAM(s) Oral every 6 hours PRN Temp greater or equal to 38C (100.4F), Mild Pain (1 - 3)      Vital Signs Last 24 Hrs  T(C): 36.9 (08 Feb 2024 13:35), Max: 37 (08 Feb 2024 00:30)  T(F): 98.5 (08 Feb 2024 13:35), Max: 98.6 (08 Feb 2024 00:30)  HR: 70 (08 Feb 2024 13:35) (64 - 90)  BP: 120/66 (08 Feb 2024 13:35) (100/71 - 128/71)  BP(mean): --  RR: 18 (08 Feb 2024 13:35) (18 - 20)  SpO2: 95% (08 Feb 2024 13:35) (91% - 99%)    Parameters below as of 08 Feb 2024 13:35  Patient On (Oxygen Delivery Method): room air        Physical Exam:    Constitutional: NAD  HEENT: EOMI  Respiratory: Respirations non-labored, no accessory muscle use  Gastrointestinal: Soft, non-tender, non-distended, no guarding or rebound  Extremities: RLE with punctate wound to medial thigh, nonbleeding, compressive ace wrap c/d/i, NVI  Neurological: A&O x 3; without gross deficit  Psych: Cooperative, appropriate affect      LABS:                        11.5   7.33  )-----------( 186      ( 08 Feb 2024 06:49 )             33.5     02-08    142  |  103  |  8.5  ----------------------------<  106<H>  3.7   |  23.0  |  0.69    Ca    8.0<L>      08 Feb 2024 06:49  Phos  3.1     02-08  Mg     1.4     02-08    TPro  5.8<L>  /  Alb  3.9  /  TBili  1.5  /  DBili  x   /  AST  20  /  ALT  20  /  AlkPhos  29<L>  02-08    PT/INR - ( 07 Feb 2024 19:10 )   PT: 11.5 sec;   INR: 1.04 ratio         PTT - ( 07 Feb 2024 19:10 )  PTT:22.9 sec  Urinalysis Basic - ( 08 Feb 2024 06:49 )    Color: x / Appearance: x / SG: x / pH: x  Gluc: 106 mg/dL / Ketone: x  / Bili: x / Urobili: x   Blood: x / Protein: x / Nitrite: x   Leuk Esterase: x / RBC: x / WBC x   Sq Epi: x / Non Sq Epi: x / Bacteria: x       Subjective: Pt evaluated at bedside this AM. Resting in NAD. Pt ambulating freely. Complains of mild pain in the RLE.      MEDICATIONS  (STANDING):  influenza   Vaccine 0.5 milliLiter(s) IntraMuscular once  sodium chloride 0.9%. 1000 milliLiter(s) (75 mL/Hr) IV Continuous <Continuous>    MEDICATIONS  (PRN):  acetaminophen     Tablet .. 650 milliGRAM(s) Oral every 6 hours PRN Temp greater or equal to 38C (100.4F), Mild Pain (1 - 3)      Vital Signs Last 24 Hrs  T(C): 36.9 (08 Feb 2024 13:35), Max: 37 (08 Feb 2024 00:30)  T(F): 98.5 (08 Feb 2024 13:35), Max: 98.6 (08 Feb 2024 00:30)  HR: 70 (08 Feb 2024 13:35) (64 - 90)  BP: 120/66 (08 Feb 2024 13:35) (100/71 - 128/71)  BP(mean): --  RR: 18 (08 Feb 2024 13:35) (18 - 20)  SpO2: 95% (08 Feb 2024 13:35) (91% - 99%)    Parameters below as of 08 Feb 2024 13:35  Patient On (Oxygen Delivery Method): room air        Physical Exam:    Constitutional: NAD  HEENT: EOMI  Respiratory: Respirations non-labored, no accessory muscle use  Gastrointestinal: Soft, non-tender, non-distended, no guarding or rebound  Extremities: RLE with superficial wound to medial thigh, nonbleeding, compressive ace wrap c/d/i, NVI  Neurological: A&O x 3; without gross deficit  Psych: Cooperative, appropriate affect      LABS:                        11.5   7.33  )-----------( 186      ( 08 Feb 2024 06:49 )             33.5     02-08    142  |  103  |  8.5  ----------------------------<  106<H>  3.7   |  23.0  |  0.69    Ca    8.0<L>      08 Feb 2024 06:49  Phos  3.1     02-08  Mg     1.4     02-08    TPro  5.8<L>  /  Alb  3.9  /  TBili  1.5  /  DBili  x   /  AST  20  /  ALT  20  /  AlkPhos  29<L>  02-08    PT/INR - ( 07 Feb 2024 19:10 )   PT: 11.5 sec;   INR: 1.04 ratio         PTT - ( 07 Feb 2024 19:10 )  PTT:22.9 sec  Urinalysis Basic - ( 08 Feb 2024 06:49 )    Color: x / Appearance: x / SG: x / pH: x  Gluc: 106 mg/dL / Ketone: x  / Bili: x / Urobili: x   Blood: x / Protein: x / Nitrite: x   Leuk Esterase: x / RBC: x / WBC x   Sq Epi: x / Non Sq Epi: x / Bacteria: x

## 2024-02-08 NOTE — PROGRESS NOTE ADULT - ATTENDING COMMENTS
Clinically stable.   Pain well controlled.     R medial thigh superficial wound, compartments soft, no clear evidence of hematoma. +palpable DP, PT.     --MMPR  --Mobilize   --Ok to dc home today

## 2024-02-08 NOTE — DISCHARGE NOTE PROVIDER - NSDCFUADDINST_GEN_ALL_CORE_FT
ACTIVITY: No heavy lifting or straining. Otherwise, you may return to your usual level of physical activity. If you are taking narcotic pain medication (such as Percocet) DO NOT drive a car, operate machinery or make important decisions.  DIET: Return to your usual diet.  NOTIFY YOUR SURGEON IF: You have any bleeding that does not stop, any pus draining from your wound(s), any fever (over 100.4 F) or chills, persistent nausea/vomiting, persistent diarrhea, or if your pain is not controlled on your discharge pain medications.  FOLLOW-UP: Please follow up with your primary care physician and Acute Care Surgery clinic [phone] in 10-14 days regarding your hospitalization. Call for appointment upon discharge.

## 2024-02-08 NOTE — DISCHARGE NOTE PROVIDER - NSFOLLOWUPCLINICS_GEN_ALL_ED_FT
[facility] Acute Care Surgery  Acute Care Surgery  [address] [place] [id number]  Phone: [phone]  Fax:   Follow Up Time: 2 weeks

## 2024-02-08 NOTE — PATIENT PROFILE ADULT - FUNCTIONAL ASSESSMENT - DAILY ACTIVITY 4.
4 = No assist / stand by assistance Implant Date: 10/14/2015 - Serial #[id number] - Model #[id number] ([employer])

## 2024-02-08 NOTE — DISCHARGE NOTE PROVIDER - NSDCMRMEDTOKEN_GEN_ALL_CORE_FT
aspirin 81 mg oral tablet: orally once a day  valsartan 160 mg oral capsule: 1 cap(s) orally once a day

## 2024-02-08 NOTE — DISCHARGE NOTE NURSING/CASE MANAGEMENT/SOCIAL WORK - PATIENT PORTAL LINK FT
You can access the FollowMyHealth Patient Portal offered by [facility] by registering at the following website: [email or web address]. By joining [employer]’s FollowMyHealth portal, you will also be able to view your health information using other applications (apps) compatible with our system.

## 2024-02-15 RX ORDER — ASPIRIN/CALCIUM CARB/MAGNESIUM 324 MG
0 TABLET ORAL
Refills: 0 | DISCHARGE

## 2024-02-15 RX ORDER — VALSARTAN 80 MG/1
1 TABLET ORAL
Refills: 0 | DISCHARGE

## 2024-02-27 ENCOUNTER — APPOINTMENT (OUTPATIENT)
Dept: TRAUMA SURGERY | Facility: CLINIC | Age: 54
End: 2024-02-27
Payer: COMMERCIAL

## 2024-02-27 VITALS
HEART RATE: 81 BPM | TEMPERATURE: 98.3 F | HEIGHT: 71 IN | DIASTOLIC BLOOD PRESSURE: 89 MMHG | WEIGHT: 174 LBS | OXYGEN SATURATION: 98 % | SYSTOLIC BLOOD PRESSURE: 159 MMHG | BODY MASS INDEX: 24.36 KG/M2 | RESPIRATION RATE: 16 BRPM

## 2024-02-27 PROCEDURE — 99212 OFFICE O/P EST SF 10 MIN: CPT

## 2024-02-27 RX ORDER — LOSARTAN POTASSIUM 100 MG/1
100 TABLET, FILM COATED ORAL
Refills: 0 | Status: ACTIVE | COMMUNITY

## 2024-02-27 NOTE — PHYSICAL EXAM
[Respiratory Effort] : normal respiratory effort [de-identified] : alert, fit, well appearing [de-identified] : R thigh hematoma appreciated, small in size, soft, no signs of compartment syndrome, no significant edema or tenseness of the skin, exam performed with chaperone.

## 2024-02-27 NOTE — HISTORY OF PRESENT ILLNESS
[FreeTextEntry1] : Patient s/p blunt trauma to the R thigh (Had vice  fall on him), with resultatn soft tissue hematoma here for follow up. Denies pain, swelling improved. No complaints

## 2024-11-27 ENCOUNTER — EMERGENCY (EMERGENCY)
Facility: HOSPITAL | Age: 54
LOS: 1 days | End: 2024-11-27
Attending: EMERGENCY MEDICINE
Payer: COMMERCIAL

## 2024-11-27 VITALS
HEART RATE: 70 BPM | WEIGHT: 181.22 LBS | RESPIRATION RATE: 20 BRPM | DIASTOLIC BLOOD PRESSURE: 73 MMHG | HEIGHT: 71 IN | OXYGEN SATURATION: 98 % | SYSTOLIC BLOOD PRESSURE: 122 MMHG | TEMPERATURE: 98 F

## 2024-11-27 DIAGNOSIS — F07.0 PERSONALITY CHANGE DUE TO KNOWN PHYSIOLOGICAL CONDITION: Chronic | ICD-10-CM

## 2024-11-27 PROCEDURE — 12041 INTMD RPR N-HF/GENIT 2.5CM/<: CPT

## 2024-11-27 PROCEDURE — 12001 RPR S/N/AX/GEN/TRNK 2.5CM/<: CPT

## 2024-11-27 PROCEDURE — 99284 EMERGENCY DEPT VISIT MOD MDM: CPT | Mod: 25

## 2024-11-27 PROCEDURE — 73140 X-RAY EXAM OF FINGER(S): CPT | Mod: 26,LT

## 2024-11-27 PROCEDURE — 99283 EMERGENCY DEPT VISIT LOW MDM: CPT | Mod: 25

## 2024-11-27 PROCEDURE — 73140 X-RAY EXAM OF FINGER(S): CPT

## 2024-11-27 RX ORDER — CEPHALEXIN 500 MG
1 CAPSULE ORAL
Qty: 40 | Refills: 0
Start: 2024-11-27 | End: 2024-12-06

## 2024-11-27 RX ORDER — OXYCODONE AND ACETAMINOPHEN 5; 325 MG/1; MG/1
1 TABLET ORAL
Qty: 12 | Refills: 0
Start: 2024-11-27 | End: 2024-11-29

## 2024-11-27 RX ORDER — CEPHALEXIN 500 MG
500 CAPSULE ORAL ONCE
Refills: 0 | Status: COMPLETED | OUTPATIENT
Start: 2024-11-27 | End: 2024-11-27

## 2024-11-27 RX ORDER — ACETAMINOPHEN 500MG 500 MG/1
650 TABLET, COATED ORAL ONCE
Refills: 0 | Status: COMPLETED | OUTPATIENT
Start: 2024-11-27 | End: 2024-11-27

## 2024-11-27 RX ADMIN — ACETAMINOPHEN 500MG 650 MILLIGRAM(S): 500 TABLET, COATED ORAL at 21:04

## 2024-11-27 RX ADMIN — Medication 500 MILLIGRAM(S): at 21:11

## 2024-11-27 RX ADMIN — Medication 500 MILLIGRAM(S): at 21:56

## 2024-11-27 NOTE — ED PROVIDER NOTE - SKIN, MLM
+ 2cm laceration of the right 2nd distal phalanx volar aspect with partial avulsion/loss of skin tissue.

## 2024-11-27 NOTE — ED ADULT NURSE NOTE - NSICDXPASTSURGICALHX_GEN_ALL_CORE_FT
PAST SURGICAL HISTORY:  No significant past surgical history     Temporal lobectomy behavior syndrome

## 2024-11-27 NOTE — ED PROVIDER NOTE - NSPTACCESSSVCSAPPT_ED_ALL_ED
SURVEY:    You may be receiving a survey from [employer] regarding your visit today. You may get this in the mail, through your MyChart or in your email. Please complete the survey to enable us to provide the highest quality of care to you and your family. If you cannot score us as very good ( 5 Stars) on any question, please feel free to call the office to discuss how we could have made your experience exceptional.     Thank you.     Clinical Care Team:  Dr. Lonny Guaman, DO Adeline Guerin LPN    Triage:  Thi Chicas, [address] [facility] Team:  [facility] (immediate)...

## 2024-11-27 NOTE — ED PROVIDER NOTE - OBJECTIVE STATEMENT
54-year-old male presented to the ED complaining of right second distal phalanx injury while using table saw her x 1 hour ago.  Patient otherwise denies numbness/tingling has no complaints this time.  Tetanus up-to-date as per patient.

## 2024-11-27 NOTE — ED PROVIDER NOTE - CLINICAL SUMMARY MEDICAL DECISION MAKING FREE TEXT BOX
54-year-old male presented to the ED complaining of right second distal phalanx injury while using table saw her x 1 hour ago.  Patient otherwise denies numbness/tingling has no complaints this time.  Tetanus up-to-date as per patient. X-rays reviewed–acute fracture of the right distal phalanx.  Patient given antibiotics for treatment of open fracture.  Wound cleansed/repaired/dressed.  In areas of avulsed skin wound was loosely approximated.  Bulky dressing applied and splints given to patient for next dressing change.  Patient stable for discharge with prompt hand follow-up.  Strict return precautions discussed with patient.

## 2024-11-27 NOTE — ED PROVIDER NOTE - NSFOLLOWUPINSTRUCTIONS_ED_ALL_ED_FT
Fracture    A fracture is a break in one of your bones. This can occur from a variety of injuries, especially traumatic ones. Symptoms include pain, bruising, or swelling. Do not use the injured limb. If a fracture is in one of the bones below your waist, do not put weight on that limb unless instructed to do so by your healthcare provider. Crutches or a cane may have been provided. A splint or cast may have been applied by your health care provider. Make sure to keep it dry and follow up with an orthopedist as instructed.    SEEK IMMEDIATE MEDICAL CARE IF YOU HAVE ANY OF THE FOLLOWING SYMPTOMS: numbness, tingling, increasing pain, or weakness in any part of the injured limb.     Laceration    A laceration is a cut that goes through all of the layers of the skin and into the tissue that is right under the skin. Some lacerations heal on their own. Others need to be closed with skin adhesive strips, skin glue, stitches (sutures), or staples. Proper laceration care minimizes the risk of infection and helps the laceration to heal better.  If non-absorbable stitches or staples have been placed, they must be taken out within the time frame instructed by your healthcare provider.    SEEK IMMEDIATE MEDICAL CARE IF YOU HAVE ANY OF THE FOLLOWING SYMPTOMS: swelling around the wound, worsening pain, drainage from the wound, red streaking going away from your wound, inability to move finger or toe near the laceration, or discoloration of skin near the laceration.     FOLLOW UP WITH HAND SPECIALIST WITHIN 1 WEEK. SUTURES MUST BE REMOVED WITHIN 10-14 DAYS.

## 2024-11-27 NOTE — ED PROVIDER NOTE - PATIENT PORTAL LINK FT
You can access the FollowMyHealth Patient Portal offered by [facility] by registering at the following website: [email or web address]. By joining [employer]’s FollowMyHealth portal, you will also be able to view your health information using other applications (apps) compatible with our system.

## 2024-11-27 NOTE — ED PROVIDER NOTE - ATTENDING APP SHARED VISIT CONTRIBUTION OF CARE
54-year-old male presented to the ED complaining of right second distal phalanx injury while using table saw her x 1 hour ago.  Patient otherwise denies numbness/tingling has no complaints this time.  Tetanus up-to-date as per patient. X-rays reviewed–acute fracture of the right distal phalanx.  Patient given antibiotics for treatment of open fracture.  Wound cleansed/repaired/dressed.  In areas of avulsed skin wound was loosely approximated.  Bulky dressing applied and splints given to patient for next dressing change.  Patient stable for discharge with prompt hand follow-up.  Strict return precautions discussed with patie

## 2024-11-27 NOTE — ED PROVIDER NOTE - CARE PROVIDERS DIRECT ADDRESSES
[email or web address]

## 2024-11-27 NOTE — ED ADULT NURSE NOTE - NSICDXPASTMEDICALHX_GEN_ALL_CORE_FT
PAST MEDICAL HISTORY:  Hypertension     Hypertension     Mild intermittent asthma without complication     Mitral valve prolapse

## 2024-11-27 NOTE — ED ADULT TRIAGE NOTE - CHIEF COMPLAINT QUOTE
" I cut my left index finger with a table saw" pt has laceration to left index finger, bleeding controlled

## 2024-11-28 PROBLEM — I10 ESSENTIAL (PRIMARY) HYPERTENSION: Chronic | Status: ACTIVE | Noted: 2024-02-07

## 2024-12-02 ENCOUNTER — APPOINTMENT (OUTPATIENT)
Dept: ORTHOPEDIC SURGERY | Facility: CLINIC | Age: 54
End: 2024-12-02